# Patient Record
Sex: FEMALE | Race: WHITE | Employment: OTHER | URBAN - METROPOLITAN AREA
[De-identification: names, ages, dates, MRNs, and addresses within clinical notes are randomized per-mention and may not be internally consistent; named-entity substitution may affect disease eponyms.]

---

## 2018-06-15 ENCOUNTER — TRANSFERRED RECORDS (OUTPATIENT)
Dept: HEALTH INFORMATION MANAGEMENT | Facility: CLINIC | Age: 73
End: 2018-06-15

## 2018-07-11 ENCOUNTER — TRANSFERRED RECORDS (OUTPATIENT)
Dept: HEALTH INFORMATION MANAGEMENT | Facility: CLINIC | Age: 73
End: 2018-07-11

## 2018-07-11 LAB
CREAT SERPL-MCNC: 0.69 MG/DL (ref 0.55–1.02)
GFR SERPL CREATININE-BSD FRML MDRD: >60 ML/MIN/1.73M2
GLUCOSE SERPL-MCNC: 128 MG/DL (ref 74–106)
HBA1C MFR BLD: 6.4 % (ref 0–5.7)
POTASSIUM SERPL-SCNC: 4.2 MMOL/L (ref 3.5–5.1)

## 2018-07-19 ENCOUNTER — ANESTHESIA EVENT (OUTPATIENT)
Dept: SURGERY | Facility: CLINIC | Age: 73
End: 2018-07-19
Payer: MEDICARE

## 2018-07-19 ENCOUNTER — HOSPITAL ENCOUNTER (OUTPATIENT)
Facility: CLINIC | Age: 73
Discharge: HOME | End: 2018-07-19
Attending: ORTHOPAEDIC SURGERY | Admitting: ORTHOPAEDIC SURGERY
Payer: MEDICARE

## 2018-07-19 ENCOUNTER — APPOINTMENT (OUTPATIENT)
Dept: GENERAL RADIOLOGY | Facility: CLINIC | Age: 73
End: 2018-07-19
Attending: ORTHOPAEDIC SURGERY
Payer: MEDICARE

## 2018-07-19 ENCOUNTER — ANESTHESIA (OUTPATIENT)
Dept: SURGERY | Facility: CLINIC | Age: 73
End: 2018-07-19
Payer: MEDICARE

## 2018-07-19 VITALS
DIASTOLIC BLOOD PRESSURE: 70 MMHG | RESPIRATION RATE: 15 BRPM | SYSTOLIC BLOOD PRESSURE: 126 MMHG | HEART RATE: 77 BPM | HEIGHT: 65 IN | WEIGHT: 261.5 LBS | OXYGEN SATURATION: 91 % | BODY MASS INDEX: 43.57 KG/M2 | TEMPERATURE: 98.4 F

## 2018-07-19 DIAGNOSIS — Z98.890 POSTOPERATIVE STATE: Primary | ICD-10-CM

## 2018-07-19 LAB — GLUCOSE BLDC GLUCOMTR-MCNC: 146 MG/DL (ref 70–99)

## 2018-07-19 PROCEDURE — 25000125 ZZHC RX 250: Performed by: NURSE ANESTHETIST, CERTIFIED REGISTERED

## 2018-07-19 PROCEDURE — 37000008 ZZH ANESTHESIA TECHNICAL FEE, 1ST 30 MIN: Performed by: ORTHOPAEDIC SURGERY

## 2018-07-19 PROCEDURE — 40000169 ZZH STATISTIC PRE-PROCEDURE ASSESSMENT I: Performed by: ORTHOPAEDIC SURGERY

## 2018-07-19 PROCEDURE — 36000060 ZZH SURGERY LEVEL 3 W FLUORO 1ST 30 MIN: Performed by: ORTHOPAEDIC SURGERY

## 2018-07-19 PROCEDURE — 25000128 H RX IP 250 OP 636: Performed by: NURSE ANESTHETIST, CERTIFIED REGISTERED

## 2018-07-19 PROCEDURE — 25000128 H RX IP 250 OP 636: Performed by: ANESTHESIOLOGY

## 2018-07-19 PROCEDURE — 37000009 ZZH ANESTHESIA TECHNICAL FEE, EACH ADDTL 15 MIN: Performed by: ORTHOPAEDIC SURGERY

## 2018-07-19 PROCEDURE — 36000058 ZZH SURGERY LEVEL 3 EA 15 ADDTL MIN: Performed by: ORTHOPAEDIC SURGERY

## 2018-07-19 PROCEDURE — 25000128 H RX IP 250 OP 636: Performed by: PHYSICIAN ASSISTANT

## 2018-07-19 PROCEDURE — 40000277 XR SURGERY CARM FLUORO LESS THAN 5 MIN W STILLS

## 2018-07-19 PROCEDURE — 25000125 ZZHC RX 250: Performed by: ORTHOPAEDIC SURGERY

## 2018-07-19 PROCEDURE — 71000027 ZZH RECOVERY PHASE 2 EACH 15 MINS: Performed by: ORTHOPAEDIC SURGERY

## 2018-07-19 PROCEDURE — 27210794 ZZH OR GENERAL SUPPLY STERILE: Performed by: ORTHOPAEDIC SURGERY

## 2018-07-19 PROCEDURE — 82962 GLUCOSE BLOOD TEST: CPT

## 2018-07-19 PROCEDURE — 71000012 ZZH RECOVERY PHASE 1 LEVEL 1 FIRST HR: Performed by: ORTHOPAEDIC SURGERY

## 2018-07-19 DEVICE — IMP WIRE KIRSCHNER 0.054X4" 78.2040
Type: IMPLANTABLE DEVICE | Site: FINGER | Status: NON-FUNCTIONAL
Removed: 2018-09-28

## 2018-07-19 RX ORDER — MEPERIDINE HYDROCHLORIDE 25 MG/ML
12.5 INJECTION INTRAMUSCULAR; INTRAVENOUS; SUBCUTANEOUS EVERY 5 MIN PRN
Status: DISCONTINUED | OUTPATIENT
Start: 2018-07-19 | End: 2018-07-19 | Stop reason: HOSPADM

## 2018-07-19 RX ORDER — LIDOCAINE HYDROCHLORIDE 20 MG/ML
INJECTION, SOLUTION INFILTRATION; PERINEURAL PRN
Status: DISCONTINUED | OUTPATIENT
Start: 2018-07-19 | End: 2018-07-19

## 2018-07-19 RX ORDER — NITROGLYCERIN 0.3 MG/1
0.3 TABLET SUBLINGUAL DAILY PRN
COMMUNITY
Start: 2017-10-13

## 2018-07-19 RX ORDER — SODIUM CHLORIDE, SODIUM LACTATE, POTASSIUM CHLORIDE, CALCIUM CHLORIDE 600; 310; 30; 20 MG/100ML; MG/100ML; MG/100ML; MG/100ML
INJECTION, SOLUTION INTRAVENOUS CONTINUOUS
Status: DISCONTINUED | OUTPATIENT
Start: 2018-07-19 | End: 2018-07-19 | Stop reason: HOSPADM

## 2018-07-19 RX ORDER — ALBUTEROL SULFATE 0.83 MG/ML
2.5 SOLUTION RESPIRATORY (INHALATION)
Status: ON HOLD | COMMUNITY
Start: 2013-05-17 | End: 2019-08-19

## 2018-07-19 RX ORDER — ACETAMINOPHEN 325 MG/1
650 TABLET ORAL
Status: DISCONTINUED | OUTPATIENT
Start: 2018-07-19 | End: 2018-07-19 | Stop reason: HOSPADM

## 2018-07-19 RX ORDER — SIMVASTATIN 40 MG
TABLET ORAL
Status: ON HOLD | COMMUNITY
Start: 2017-10-13 | End: 2023-10-10

## 2018-07-19 RX ORDER — HYDROMORPHONE HYDROCHLORIDE 1 MG/ML
.3-.5 INJECTION, SOLUTION INTRAMUSCULAR; INTRAVENOUS; SUBCUTANEOUS EVERY 10 MIN PRN
Status: DISCONTINUED | OUTPATIENT
Start: 2018-07-19 | End: 2018-07-19 | Stop reason: HOSPADM

## 2018-07-19 RX ORDER — LOSARTAN POTASSIUM 50 MG/1
50 TABLET ORAL DAILY
COMMUNITY
Start: 2018-06-05

## 2018-07-19 RX ORDER — FENTANYL CITRATE 50 UG/ML
25-50 INJECTION, SOLUTION INTRAMUSCULAR; INTRAVENOUS
Status: DISCONTINUED | OUTPATIENT
Start: 2018-07-19 | End: 2018-07-19 | Stop reason: HOSPADM

## 2018-07-19 RX ORDER — NALOXONE HYDROCHLORIDE 0.4 MG/ML
.1-.4 INJECTION, SOLUTION INTRAMUSCULAR; INTRAVENOUS; SUBCUTANEOUS
Status: DISCONTINUED | OUTPATIENT
Start: 2018-07-19 | End: 2018-07-19 | Stop reason: HOSPADM

## 2018-07-19 RX ORDER — ASPIRIN 81 MG/1
162 TABLET ORAL DAILY
Status: ON HOLD | COMMUNITY
Start: 2017-10-13 | End: 2019-08-19

## 2018-07-19 RX ORDER — ONDANSETRON 4 MG/1
4 TABLET, ORALLY DISINTEGRATING ORAL EVERY 30 MIN PRN
Status: DISCONTINUED | OUTPATIENT
Start: 2018-07-19 | End: 2018-07-19 | Stop reason: HOSPADM

## 2018-07-19 RX ORDER — ESCITALOPRAM OXALATE 20 MG/1
20 TABLET ORAL DAILY
COMMUNITY
Start: 2017-05-19

## 2018-07-19 RX ORDER — AMOXICILLIN 250 MG
1-2 CAPSULE ORAL 2 TIMES DAILY
Qty: 30 TABLET | Refills: 0 | Status: ON HOLD | OUTPATIENT
Start: 2018-07-19 | End: 2018-09-28

## 2018-07-19 RX ORDER — PROPOFOL 10 MG/ML
INJECTION, EMULSION INTRAVENOUS CONTINUOUS PRN
Status: DISCONTINUED | OUTPATIENT
Start: 2018-07-19 | End: 2018-07-19

## 2018-07-19 RX ORDER — ONDANSETRON 2 MG/ML
4 INJECTION INTRAMUSCULAR; INTRAVENOUS EVERY 30 MIN PRN
Status: DISCONTINUED | OUTPATIENT
Start: 2018-07-19 | End: 2018-07-19 | Stop reason: HOSPADM

## 2018-07-19 RX ORDER — CEFAZOLIN SODIUM 1 G/3ML
1 INJECTION, POWDER, FOR SOLUTION INTRAMUSCULAR; INTRAVENOUS SEE ADMIN INSTRUCTIONS
Status: DISCONTINUED | OUTPATIENT
Start: 2018-07-19 | End: 2018-07-19 | Stop reason: HOSPADM

## 2018-07-19 RX ORDER — ONDANSETRON 2 MG/ML
INJECTION INTRAMUSCULAR; INTRAVENOUS PRN
Status: DISCONTINUED | OUTPATIENT
Start: 2018-07-19 | End: 2018-07-19

## 2018-07-19 RX ORDER — HYDROMORPHONE HYDROCHLORIDE 1 MG/ML
.3-.5 INJECTION, SOLUTION INTRAMUSCULAR; INTRAVENOUS; SUBCUTANEOUS EVERY 5 MIN PRN
Status: DISCONTINUED | OUTPATIENT
Start: 2018-07-19 | End: 2018-07-19 | Stop reason: HOSPADM

## 2018-07-19 RX ORDER — OXYCODONE HYDROCHLORIDE 5 MG/1
5-10 TABLET ORAL
Qty: 30 TABLET | Refills: 0 | Status: ON HOLD | OUTPATIENT
Start: 2018-07-19 | End: 2018-09-28

## 2018-07-19 RX ORDER — OXYCODONE HYDROCHLORIDE 5 MG/1
5 TABLET ORAL
Status: DISCONTINUED | OUTPATIENT
Start: 2018-07-19 | End: 2018-07-19 | Stop reason: HOSPADM

## 2018-07-19 RX ORDER — MEPERIDINE HYDROCHLORIDE 25 MG/ML
12.5 INJECTION INTRAMUSCULAR; INTRAVENOUS; SUBCUTANEOUS
Status: DISCONTINUED | OUTPATIENT
Start: 2018-07-19 | End: 2018-07-19 | Stop reason: HOSPADM

## 2018-07-19 RX ORDER — CEFAZOLIN SODIUM 2 G/100ML
2 INJECTION, SOLUTION INTRAVENOUS
Status: COMPLETED | OUTPATIENT
Start: 2018-07-19 | End: 2018-07-19

## 2018-07-19 RX ORDER — FLUTICASONE PROPIONATE 220 UG/1
2 AEROSOL, METERED RESPIRATORY (INHALATION) AT BEDTIME
COMMUNITY
Start: 2017-05-19

## 2018-07-19 RX ORDER — ACETAMINOPHEN 325 MG/1
650 TABLET ORAL EVERY 4 HOURS PRN
Qty: 100 TABLET | Refills: 0 | Status: ON HOLD | OUTPATIENT
Start: 2018-07-19 | End: 2019-08-19

## 2018-07-19 RX ORDER — ALBUTEROL SULFATE 90 UG/1
2 AEROSOL, METERED RESPIRATORY (INHALATION) EVERY 4 HOURS PRN
COMMUNITY
Start: 2017-03-10

## 2018-07-19 RX ORDER — METOPROLOL TARTRATE 1 MG/ML
1-2 INJECTION, SOLUTION INTRAVENOUS EVERY 5 MIN PRN
Status: DISCONTINUED | OUTPATIENT
Start: 2018-07-19 | End: 2018-07-19 | Stop reason: HOSPADM

## 2018-07-19 RX ADMIN — LIDOCAINE HYDROCHLORIDE 60 MG: 20 INJECTION, SOLUTION INFILTRATION; PERINEURAL at 17:12

## 2018-07-19 RX ADMIN — SODIUM CHLORIDE, POTASSIUM CHLORIDE, SODIUM LACTATE AND CALCIUM CHLORIDE: 600; 310; 30; 20 INJECTION, SOLUTION INTRAVENOUS at 16:26

## 2018-07-19 RX ADMIN — SODIUM CHLORIDE, POTASSIUM CHLORIDE, SODIUM LACTATE AND CALCIUM CHLORIDE: 600; 310; 30; 20 INJECTION, SOLUTION INTRAVENOUS at 18:02

## 2018-07-19 RX ADMIN — CEFAZOLIN SODIUM 2 G: 2 INJECTION, SOLUTION INTRAVENOUS at 17:10

## 2018-07-19 RX ADMIN — DEXMEDETOMIDINE HYDROCHLORIDE 12 MCG: 100 INJECTION, SOLUTION INTRAVENOUS at 17:09

## 2018-07-19 RX ADMIN — PROPOFOL 180 MCG/KG/MIN: 10 INJECTION, EMULSION INTRAVENOUS at 17:12

## 2018-07-19 RX ADMIN — ONDANSETRON 4 MG: 2 INJECTION INTRAMUSCULAR; INTRAVENOUS at 17:30

## 2018-07-19 NOTE — ANESTHESIA CARE TRANSFER NOTE
Patient: Day Sahu    Procedure(s):  LEFT MIDDLE PROXIMAL INTERPHALANGEAL JOINT FUSION AND LEFT RING FINGER PROXIMAL INTERPHALANGEAL JOINT DEBRIDEMENT - Wound Class: I-Clean   - Wound Class: II-Clean Contaminated    Diagnosis: LEFT MIDDLE AND RING FINGER PIP JOINT OSTEOARTHRITIS   Diagnosis Additional Information: No value filed.    Anesthesia Type:   MAC     Note:  Airway :Room Air  Patient transferred to:PACU        Vitals: (Last set prior to Anesthesia Care Transfer)    CRNA VITALS  7/19/2018 1745 - 7/19/2018 1820      7/19/2018             EKG: Sinus rhythm                Electronically Signed By: INA Herrera CRNA  July 19, 2018  6:20 PM

## 2018-07-19 NOTE — BRIEF OP NOTE
Preop:  Left middle and ring finger PIP joint arthritis.  Postop:  Same    Procedure:    Left middle finger PIP joint fusion  Left ring finger PIP joint removal of loose body.    Surgeons:  Zev Howell MD  First Assistant:  GRETEL Schmitz    Complications:  None  Implants:  2 k wires  Antibiotics:  Ancef  Anesthesia:  Sedation    EBL:  3 cc

## 2018-07-19 NOTE — DISCHARGE INSTRUCTIONS
Same Day Surgery Discharge Instructions for  Sedation and General Anesthesia       It's not unusual to feel dizzy, light-headed or faint for up to 24 hours after surgery or while taking pain medication.  If you have these symptoms: sit for a few minutes before standing and have someone assist you when you get up to walk or use the bathroom.      You should rest and relax for the next 24 hours. We recommend you make arrangements to have an adult stay with you for at least 24 hours after your discharge.  Avoid hazardous and strenuous activity.      DO NOT DRIVE any vehicle or operate mechanical equipment for 24 hours following the end of your surgery.  Even though you may feel normal, your reactions may be affected by the medication you have received.      Do not drink alcoholic beverages for 24 hours following surgery.       Slowly progress to your regular diet as you feel able. It's not unusual to feel nauseated and/or vomit after receiving anesthesia.  If you develop these symptoms, drink clear liquids (apple juice, ginger ale, broth, 7-up, etc. ) until you feel better.  If your nausea and vomiting persists for 24 hours, please notify your surgeon.        All narcotic pain medications, along with inactivity and anesthesia, can cause constipation. Drinking plenty of liquids and increasing fiber intake will help.      For any questions of a medical nature, call your surgeon.      Do not make important decisions for 24 hours.      If you had general anesthesia, you may have a sore throat for a couple of days related to the breathing tube used during surgery.  You may use Cepacol lozenges to help with this discomfort.  If it worsens or if you develop a fever, contact your surgeon.       If you feel your pain is not well managed with the pain medications prescribed by your surgeon, please contact your surgeon's office to let them know so they can address your concerns.         General:    Elevate extremity to prevent  swelling.  For 2-3 days post-operatively elevate the extremity above the level of your heart to reduce swelling.    Apply ice to the extremity 3-4 times a day (20 minutes on/20 minutes off) for 3-4 days or until the swelling subsides.  If you are in a splint, apply ice on the top of the splint .    If you are experiencing any of the following call our office or the doctor on call immediately.      Fever greater than 101 degrees Fahrenheit, chills    Excessive redness, swelling, or drainage from incisions    Bruising and discoloration is common following surgery.    Medications:    As a reminder, do not wait until you are almost out of medication to contact the clinic for a refill.  Additionally, narcotic prescriptions cannot be faxed or electronically sent to your pharmacy, these must be picked up from the clinic.     You will be discharged with a prescription for pain medication.  Let us know in advance if you are running low.  Refills are NOT handled on the weekend or after hours.      You may use Tylenol instead of the pain medication for milder pain, or you can supplement the prescribed pain medication with Ibuprofen 800 mg every 8 hours.    Pain medication can cause constipation.  You may use an over-the-counter laxative or stool softener (Colace, Metamucil, etc.)  As needed until your bowel function returns to normal.      You may resume your preoperative medications.    Wound Care:    Keep your incision dry in the shower.  You may bathe and keep your extremity out of water or shower.  If you shower, protect it with a plastic bag.  Place bag over cast and tape it closed, cover the tape with a towel, and cover the first bag with a towel or another bag and tape it closed above the towel.        DO NOT change your dressing.  This will be done at the office or you will have specific orders on your discharge paperwork you receive after surgery.  Keep your cast or dressing dry.       Do not use any ointments or  lotions on your incision.      Follow Up appointment:  Please make sure your post-op appointment is made according to your discharge orders from surgery.  If you have questions please call my office.    Zev Howell MD  [phone]

## 2018-07-19 NOTE — IP AVS SNAPSHOT
MRN:[id number]                      After Visit Summary   7/19/2018    Day Sahu    MRN: [id number]           Thank you!     Thank you for choosing [facility] for your care. Our goal is always to provide you with excellent care. Hearing back from our patients is one way we can continue to improve our services. Please take a few minutes to complete the written survey that you may receive in the mail after you visit with us. Thank you!        Patient Information     Date Of Birth          1945        Designated Caregiver       Most Recent Value    Caregiver    Will someone help with your care after discharge? yes    Name of designated caregiver don    Phone number of caregiver [phone]    Caregiver address [address]      About your hospital stay     You were admitted on:  July 19, 2018 You last received care in the:  [facility] PreOP/Phase II    You were discharged on:  July 19, 2018       Who to Call     For medical emergencies, please call 911.  For non-urgent questions about your medical care, please call your primary care provider or clinic, [phone]  For questions related to your surgery, please call your surgery clinic        Attending Provider     Provider Specialty    Zev Howell MD Surgery       Primary Care Provider Office Phone # Fax #    Aundrea V Lyadova [phone]      After Care Instructions     Discharge Instructions       Review outpatient procedure discharge instructions with patient as directed by Provider            Ice to affected area       Ice pack to surgical site every 15 minutes per hour for 24 hours            No Dressing Change       No dressing change until follow up appointment.            No weight bearing           Return to clinic       Return to clinic in 2 weeks                  Further instructions from your care team       Same Day Surgery Discharge Instructions for  Sedation and General Anesthesia        It's not unusual to feel dizzy, light-headed or faint for up to 24 hours after surgery or while taking pain medication.  If you have these symptoms: sit for a few minutes before standing and have someone assist you when you get up to walk or use the bathroom.      You should rest and relax for the next 24 hours. We recommend you make arrangements to have an adult stay with you for at least 24 hours after your discharge.  Avoid hazardous and strenuous activity.      DO NOT DRIVE any vehicle or operate mechanical equipment for 24 hours following the end of your surgery.  Even though you may feel normal, your reactions may be affected by the medication you have received.      Do not drink alcoholic beverages for 24 hours following surgery.       Slowly progress to your regular diet as you feel able. It's not unusual to feel nauseated and/or vomit after receiving anesthesia.  If you develop these symptoms, drink clear liquids (apple juice, ginger ale, broth, 7-up, etc. ) until you feel better.  If your nausea and vomiting persists for 24 hours, please notify your surgeon.        All narcotic pain medications, along with inactivity and anesthesia, can cause constipation. Drinking plenty of liquids and increasing fiber intake will help.      For any questions of a medical nature, call your surgeon.      Do not make important decisions for 24 hours.      If you had general anesthesia, you may have a sore throat for a couple of days related to the breathing tube used during surgery.  You may use Cepacol lozenges to help with this discomfort.  If it worsens or if you develop a fever, contact your surgeon.       If you feel your pain is not well managed with the pain medications prescribed by your surgeon, please contact your surgeon's office to let them know so they can address your concerns.         General:    Elevate extremity to prevent swelling.  For 2-3 days post-operatively elevate the extremity above the level  of your heart to reduce swelling.    Apply ice to the extremity 3-4 times a day (20 minutes on/20 minutes off) for 3-4 days or until the swelling subsides.  If you are in a splint, apply ice on the top of the splint .    If you are experiencing any of the following call our office or the doctor on call immediately.      Fever greater than 101 degrees Fahrenheit, chills    Excessive redness, swelling, or drainage from incisions    Bruising and discoloration is common following surgery.    Medications:    As a reminder, do not wait until you are almost out of medication to contact the clinic for a refill.  Additionally, narcotic prescriptions cannot be faxed or electronically sent to your pharmacy, these must be picked up from the clinic.     You will be discharged with a prescription for pain medication.  Let us know in advance if you are running low.  Refills are NOT handled on the weekend or after hours.      You may use Tylenol instead of the pain medication for milder pain, or you can supplement the prescribed pain medication with Ibuprofen 800 mg every 8 hours.    Pain medication can cause constipation.  You may use an over-the-counter laxative or stool softener (Colace, Metamucil, etc.)  As needed until your bowel function returns to normal.      You may resume your preoperative medications.    Wound Care:    Keep your incision dry in the shower.  You may bathe and keep your extremity out of water or shower.  If you shower, protect it with a plastic bag.  Place bag over cast and tape it closed, cover the tape with a towel, and cover the first bag with a towel or another bag and tape it closed above the towel.        DO NOT change your dressing.  This will be done at the office or you will have specific orders on your discharge paperwork you receive after surgery.  Keep your cast or dressing dry.       Do not use any ointments or lotions on your incision.      Follow Up appointment:  Please make sure your  "post-op appointment is made according to your discharge orders from surgery.  If you have questions please call my office.    Zev Howell MD  [phone]      Pending Results     Date and Time Order Name Status Description    2018 1816 XR Surgery PAM Fluoro L/T 5 Min w Stills In process             Admission Information     Date & Time Provider Department Dept. Phone    2018 Zev Howell MD [facility] [place] [facility] PreOP/Phase [phone]      Your Vitals Were     Blood Pressure Pulse Temperature Respirations Height Weight    126/70 77 98.4  F (36.9  C) 15 1.638 m (5' 4.5\") 118.6 kg (261 lb 8 oz)    Pulse Oximetry BMI (Body Mass Index)                91% 44.19 kg/m2          MyChart Information     [facility] lets you send messages to your doctor, view your test results, renew your prescriptions, schedule appointments and more. To sign up, go to [email or web address] . Click on \"Log in\" on the left side of the screen, which will take you to the Welcome page. Then click on \"Sign up Now\" on the right side of the page.     You will be asked to enter the access code listed below, as well as some personal information. Please follow the directions to create your username and password.     Your access code is: [email or web address]  Expires: 10/17/2018  6:34 PM     Your access code will  in 90 days. If you need help or a new code, please call your [facility] clinic or [phone].        Care EveryWhere ID     This is your Care EveryWhere ID. This could be used by other organizations to access your [facility] medical records  [id number]        Equal Access to Services     GINA NIETO : Hadii agnieszka Gomez, meg berrios, carlyle winchesteraltylor paulson. So [facility] [phone].    ATENCIÓN: Si habla español, tiene a duque disposición servicios gratuitos de asistencia lingüística. Llame al [phone].    We comply with applicable federal civil rights laws and " [place] laws. We do not discriminate on the basis of race, color, national origin, age, disability, sex, sexual orientation, or gender identity.               Review of your medicines      UNREVIEWED medicines. Ask your doctor about these medicines        Dose / Directions    * albuterol (5 MG/ML) 0.5% neb solution   Commonly known as:  PROVENTIL        Dose:  2.5 mg   Take 2.5 mg by nebulization daily.   Refills:  0       * PROVENTIL  (90 Base) MCG/ACT Inhaler   Generic drug:  albuterol        Dose:  2 puff   Inhale 2 puffs into the lungs 2 times daily (before meals).   Refills:  0       * albuterol (2.5 MG/3ML) 0.083% neb solution        Dose:  2.5 mg   Inhale 2.5 mg into the lungs   Refills:  0       * albuterol 108 (90 Base) MCG/ACT Inhaler   Commonly known as:  PROAIR HFA/PROVENTIL HFA/VENTOLIN HFA        Dose:  2 puff   Inhale 2 puffs into the lungs   Refills:  0       aspirin 81 MG EC tablet        Dose:  162 mg   Take 162 mg by mouth daily   Refills:  0       CALCIUM PLUS ADVANCED PO        Dose:  1 each   Take 1 each by mouth daily.   Refills:  0       fluticasone 220 MCG/ACT Inhaler   Commonly known as:  FLOVENT HFA        Dose:  2 puff   Inhale 2 puffs into the lungs 2 times daily   Refills:  0       * LEXAPRO 20 MG tablet   Generic drug:  escitalopram        Dose:  30 mg   Take 30 mg by mouth daily.   Refills:  0       * escitalopram 20 MG tablet   Commonly known as:  LEXAPRO        Dose:  20 mg   Take 20 mg by mouth   Refills:  0       LISINOPRIL PO        Take  by mouth.   Refills:  0       losartan 50 MG tablet   Commonly known as:  COZAAR        Dose:  50 mg   Take 50 mg by mouth once   Refills:  0       metFORMIN 1000 MG tablet   Commonly known as:  GLUCOPHAGE        Dose:  1000 mg   Take 1,000 mg by mouth 2 times daily (with meals)   Refills:  0       MULTIVITAMIN & MINERAL Liqd        Dose:  1 each   Take 1 each by mouth daily.   Refills:  0       nitroGLYcerin 0.3 MG sublingual tablet    Commonly known as:  NITROSTAT        Dose:  0.3 mg   Place 0.3 mg under the tongue daily as needed   Refills:  0       * simvastatin 10 MG tablet   Commonly known as:  ZOCOR        Take  by mouth At Bedtime.   Refills:  0       * simvastatin 40 MG tablet   Commonly known as:  ZOCOR        TAKE 1 TABLET BY MOUTH EVERY DAY   Refills:  0       * vitamin D 2000 units tablet        Dose:  1 tablet   Take 1 tablet by mouth daily.   Refills:  0       * D 5000 5000 units Tabs   Generic drug:  Cholecalciferol        Dose:  1 tablet   Take 1 tablet by mouth   Refills:  0       * Notice:  This list has 10 medication(s) that are the same as other medications prescribed for you. Read the directions carefully, and ask your doctor or other care provider to review them with you.      START taking        Dose / Directions    acetaminophen 325 MG tablet   Commonly known as:  TYLENOL   Used for:  Postoperative state        Dose:  650 mg   Take 2 tablets (650 mg) by mouth every 4 hours as needed for other (mild pain)   Quantity:  100 tablet   Refills:  0       oxyCODONE IR 5 MG tablet   Commonly known as:  ROXICODONE   Used for:  Postoperative state        Dose:  5-10 mg   Take 1-2 tablets (5-10 mg) by mouth every 3 hours as needed for pain or other (Moderate to Severe)   Quantity:  30 tablet   Refills:  0       senna-docusate 8.6-50 MG per tablet   Commonly known as:  SENOKOT-S;PERICOLACE   Used for:  Postoperative state        Dose:  1-2 tablet   Take 1-2 tablets by mouth 2 times daily Take while on oral narcotics to prevent or treat constipation.   Quantity:  30 tablet   Refills:  0            Where to get your medicines      Some of these will need a paper prescription and others can be bought over the counter. Ask your nurse if you have questions.     Bring a paper prescription for each of these medications     acetaminophen 325 MG tablet    oxyCODONE IR 5 MG tablet    senna-docusate 8.6-50 MG per tablet                Protect  others around you: Learn how to safely use, store and throw away your medicines at www.disposemymeds.org.        Information about OPIOIDS     PRESCRIPTION OPIOIDS: WHAT YOU NEED TO KNOW   We gave you an opioid (narcotic) pain medicine. It is important to manage your pain, but opioids are not always the best choice. You should first try all the other options your care team gave you. Take this medicine for as short a time (and as few doses) as possible.     These medicines have risks:    DO NOT drive when on new or higher doses of pain medicine. These medicines can affect your alertness and reaction times, and you could be arrested for driving under the influence (DUI). If you need to use opioids long-term, talk to your care team about driving.    DO NOT operate heave machinery    DO NOT do any other dangerous activities while taking these medicines.     DO NOT drink any alcohol while taking these medicines.      If the opioid prescribed includes acetaminophen, DO NOT take with any other medicines that contain acetaminophen. Read all labels carefully. Look for the word  acetaminophen  or  Tylenol.  Ask your pharmacist if you have questions or are unsure.    You can get addicted to pain medicines, especially if you have a history of addiction (chemical, alcohol or substance dependence). Talk to your care team about ways to reduce this risk.    Store your pills in a secure place, locked if possible. We will not replace any lost or stolen medicine. If you don t finish your medicine, please throw away (dispose) as directed by your pharmacist. The [place] [employer]ution [employer] has more information about safe disposal: https://www.pca.state.[place]/living-green/managing-unwanted-medications.     All opioids tend to cause constipation. Drink plenty of water and eat foods that have a lot of fiber, such as fruits, vegetables, prune juice, apple juice and high-fiber cereal. Take a laxative (Miralax, milk of magnesia,  Dalila, Senna) if you don t move your bowels at least every other day.              Medication List: This is a list of all your medications and when to take them. Check marks below indicate your daily home schedule. Keep this list as a reference.      Medications           Morning Afternoon Evening Bedtime As Needed    acetaminophen 325 MG tablet   Commonly known as:  TYLENOL   Take 2 tablets (650 mg) by mouth every 4 hours as needed for other (mild pain)                                * albuterol (5 MG/ML) 0.5% neb solution   Commonly known as:  PROVENTIL   Take 2.5 mg by nebulization daily.                                * PROVENTIL  (90 Base) MCG/ACT Inhaler   Inhale 2 puffs into the lungs 2 times daily (before meals).   Generic drug:  albuterol                                * albuterol (2.5 MG/3ML) 0.083% neb solution   Inhale 2.5 mg into the lungs                                * albuterol 108 (90 Base) MCG/ACT Inhaler   Commonly known as:  PROAIR HFA/PROVENTIL HFA/VENTOLIN HFA   Inhale 2 puffs into the lungs                                aspirin 81 MG EC tablet   Take 162 mg by mouth daily                                CALCIUM PLUS ADVANCED PO   Take 1 each by mouth daily.                                fluticasone 220 MCG/ACT Inhaler   Commonly known as:  FLOVENT HFA   Inhale 2 puffs into the lungs 2 times daily                                * LEXAPRO 20 MG tablet   Take 30 mg by mouth daily.   Generic drug:  escitalopram                                * escitalopram 20 MG tablet   Commonly known as:  LEXAPRO   Take 20 mg by mouth                                LISINOPRIL PO   Take  by mouth.                                losartan 50 MG tablet   Commonly known as:  COZAAR   Take 50 mg by mouth once                                metFORMIN 1000 MG tablet   Commonly known as:  GLUCOPHAGE   Take 1,000 mg by mouth 2 times daily (with meals)                                MULTIVITAMIN & MINERAL Liqd    Take 1 each by mouth daily.                                nitroGLYcerin 0.3 MG sublingual tablet   Commonly known as:  NITROSTAT   Place 0.3 mg under the tongue daily as needed                                oxyCODONE IR 5 MG tablet   Commonly known as:  ROXICODONE   Take 1-2 tablets (5-10 mg) by mouth every 3 hours as needed for pain or other (Moderate to Severe)                                senna-docusate 8.6-50 MG per tablet   Commonly known as:  SENOKOT-S;PERICOLACE   Take 1-2 tablets by mouth 2 times daily Take while on oral narcotics to prevent or treat constipation.                                * simvastatin 10 MG tablet   Commonly known as:  ZOCOR   Take  by mouth At Bedtime.                                * simvastatin 40 MG tablet   Commonly known as:  ZOCOR   TAKE 1 TABLET BY MOUTH EVERY DAY                                * vitamin D 2000 units tablet   Take 1 tablet by mouth daily.                                * D 5000 5000 units Tabs   Take 1 tablet by mouth   Generic drug:  Cholecalciferol                                * Notice:  This list has 10 medication(s) that are the same as other medications prescribed for you. Read the directions carefully, and ask your doctor or other care provider to review them with you.

## 2018-07-19 NOTE — IP AVS SNAPSHOT
[facility] PreOP/Phase II    [address]    [place] [address]    Phone:  [phone]                                       After Visit Summary   7/19/2018    Day Sahu    MRN: [id number]           After Visit Summary Signature Page     I have received my discharge instructions, and my questions have been answered. I have discussed any challenges I see with this plan with the nurse or doctor.    ..........................................................................................................................................  Patient/Patient Representative Signature      ..........................................................................................................................................  Patient Representative Print Name and Relationship to Patient    ..................................................               ................................................  Date                                            Time    ..........................................................................................................................................  Reviewed by Signature/Title    ...................................................              ..............................................  Date                                                            Time

## 2018-07-19 NOTE — ANESTHESIA PREPROCEDURE EVALUATION
Procedure: Procedure(s):  ARTHRODESIS FINGER(S)  COMBINED IRRIGATION AND DEBRIDEMENT FINGER  Preop diagnosis: LEFT MIDDLE AND RING FINGER PIP JOINT OSTEOARTHRITIS     No Known Allergies  Past Medical History:   Diagnosis Date     Arthritis      Depression      Diabetes (H)      Heartburn      Hypertension      Malabsorption      Morbid obesity (H)      Restless leg syndrome      Sleep apnea      Past Surgical History:   Procedure Laterality Date     APPENDECTOMY       GYN SURGERY      D&C     LAP ADJUSTABLE GASTRIC BAND       ORTHOPEDIC SURGERY      LEFT HAND SURGERY     ORTHOPEDIC SURGERY      TOTAL KNEE     Social History   Substance Use Topics     Smoking status: Unknown If Ever Smoked     Smokeless tobacco: Not on file     Alcohol use Not on file     Prior to Admission medications    Medication Sig Start Date End Date Taking? Authorizing Provider   albuterol (PROVENTIL) (5 MG/ML) 0.5% nebulizer solution Take 2.5 mg by nebulization daily.    Reported, Patient   Albuterol Sulfate (PROVENTIL HFA) 108 (90 BASE) MCG/ACT AERS Inhale 2 puffs into the lungs 2 times daily (before meals).    Reported, Patient   Cholecalciferol (VITAMIN D) 2000 UNITS tablet Take 1 tablet by mouth daily.    Reported, Patient   escitalopram (LEXAPRO) 20 MG tablet Take 30 mg by mouth daily.    Reported, Patient   LISINOPRIL PO Take  by mouth.    Reported, Patient   Misc Natural Products (CALCIUM PLUS ADVANCED PO) Take 1 each by mouth daily.    Reported, Patient   Multiple Vitamins-Minerals (MULTIVITAMIN & MINERAL) LIQD Take 1 each by mouth daily.    Reported, Patient   simvastatin (ZOCOR) 10 MG tablet Take  by mouth At Bedtime.    Reported, Patient     Current Facility-Administered Medications Ordered in Epic   Medication Dose Route Frequency Last Rate Last Dose     ceFAZolin (ANCEF) 1 g vial to attach to  ml bag for ADULT or 50 ml bag for PEDS  1 g Intravenous See Admin Instructions         ceFAZolin (ANCEF) intermittent infusion 2 g  in 100 mL dextrose PRE-MIX  2 g Intravenous Pre-Op/Pre-procedure x 1 dose         lactated ringers infusion   Intravenous Continuous         lidocaine 1 % 1 mL  1 mL Other Q1H PRN         sodium chloride (PF) 0.9% PF flush 3 mL  3 mL Intracatheter Q1H PRN         sodium chloride (PF) 0.9% PF flush 3 mL  3 mL Intracatheter Q8H         No current Epic-ordered outpatient prescriptions on file.       lactated ringers       Wt Readings from Last 1 Encounters:   05/24/13 116.7 kg (257 lb 4.8 oz)     Temp Readings from Last 1 Encounters:   No data found for Temp     BP Readings from Last 6 Encounters:   No data found for BP     Pulse Readings from Last 4 Encounters:   No data found for Pulse     Resp Readings from Last 1 Encounters:   No data found for Resp     SpO2 Readings from Last 1 Encounters:   No data found for SpO2     No results for input(s): NA, POTASSIUM, CHLORIDE, CO2, ANIONGAP, GLC, BUN, CR, FLORECITA in the last 25664 hours.  No results for input(s): AST, ALT, ALKPHOS, BILITOTAL, LIPASE in the last 85828 hours.  No results for input(s): WBC, HGB, PLT in the last 99205 hours.  No results for input(s): ABO, RH in the last 60247 hours.  No results for input(s): INR, PTT in the last 60969 hours.   No results for input(s): TROPI in the last 87489 hours.  No results for input(s): PH, PCO2, PO2, HCO3 in the last 14781 hours.  No results for input(s): HCG in the last 83914 hours.  No results found for this or any previous visit (from the past 744 hour(s)).    RECENT LABS:   ECG:   ECHO:       Anesthesia Evaluation     .             ROS/MED HX    ENT/Pulmonary:     (+)sleep apnea, asthma uses CPAP , . .    Neurologic:  - neg neurologic ROS     Cardiovascular:     (+) Dyslipidemia, hypertension--CAD, --. : . . . :. .       METS/Exercise Tolerance:     Hematologic:         Musculoskeletal:   (+) arthritis, , , -       GI/Hepatic:         Renal/Genitourinary:         Endo:     (+) type II DM Obesity, .      Psychiatric:      (+) psychiatric history depression      Infectious Disease:         Malignancy:         Other:                     Physical Exam      Airway   Mallampati: III  TM distance: >3 FB  Neck ROM: full    Dental     Cardiovascular   Rhythm and rate: regular and normal      Pulmonary                     Anesthesia Plan      History & Physical Review  History and physical reviewed and following examination; no interval change.    ASA Status:  3 .    NPO Status:  > 8 hours    Plan for MAC Reason for MAC:  Difficulty with conscious sedation (QS)  PONV prophylaxis:  Ondansetron (or other 5HT-3)       Postoperative Care  Postoperative pain management:  IV analgesics.      Consents  Anesthetic plan, risks, benefits and alternatives discussed with:  Patient..                          .

## 2018-07-20 NOTE — OP NOTE
Procedure Date: 07/19/2018      PREOPERATIVE DIAGNOSIS:  Left middle finger and ring finger proximal interphalangeal joint arthritis.      POSTOPERATIVE DIAGNOSIS:  Left middle finger and ring finger proximal interphalangeal joint arthritis.      PROCEDURES PERFORMED:   1.  Left middle finger PIP joint fusion.   2.  Left ring finger PIP joint removal of loose body.        INDICATION:  End-stage arthritis of the middle finger at the PIP joint and loose body of the ring finger at the PIP joint.      COMPLICATIONS:  None.      IMPLANTS:  None.      SPECIMENS:  None sent.      ANTIBIOTICS:  Ancef.      ANESTHESIA:  Sedation plus local anesthetic.      ESTIMATED BLOOD LOSS:  Less than 3 mL.      FIRST ASSISTANT:  Leigha Tristan PA-C      INDICATIONS:  Consent was for left middle finger PIP joint fusion and left ring finger PIP removal of loose body and joint debridement.  Risk factors that were discussed were infection, bleeding, nerve damage, heart attack, stroke, death, blood clots, no relief of symptoms, worsening symptoms and revision surgery.  Consent was signed voluntarily.  Surgical site was marked by the patient and myself.  Surgical timeout commenced before surgery.      DESCRIPTION OF PROCEDURE:  With the patient properly identified in the preop holding area, the risk factors were discussed and surgical sites marked.  After sterile prep and drape and local anesthetic was injected, a sterile tourniquet was inflated.  First, on the ring finger over the PIP joint, a skin incision was made, coming directly on the ulnar aspect of the extensor tendon joint into the PIP joint.  The joint loose body was identified.  It was removed without difficulties.        Incision was then made over the middle finger where a previous incision was made from a surgery years ago.  A full-thickness skin were developed, extensor tendon was split and found end-stage arthritis.  Rongeurs were used to smooth it down followed by an  oscillating saw with copious amounts of irrigation.  Both ends were cut with approximately a 25-degree angulation confirmed by imaging.  Two K-wires were placed in position and had excellent alignment and positioning.  The K-wire were bent and cut.  The wounds were copiously irrigated and closed with 3-0 Vicryl for the extensor tendon and 4-0 nylon for both skin incisions.  Sterile dressings were applied and placed in a splint.  All sponge and instrument counts were correct.           JUDY BARONE MD             D: 2018   T: 2018   MT: OREN      Name:     MALGORZATA ORLANDO   MRN:      [id number]        Account:        [id number]   :      1945           Procedure Date: 2018      Document: [id number]

## 2018-07-20 NOTE — ANESTHESIA POSTPROCEDURE EVALUATION
Patient: Day Sahu    Procedure(s):  LEFT MIDDLE PROXIMAL INTERPHALANGEAL JOINT FUSION AND LEFT RING FINGER PROXIMAL INTERPHALANGEAL JOINT DEBRIDEMENT - Wound Class: I-Clean   - Wound Class: II-Clean Contaminated    Diagnosis:LEFT MIDDLE AND RING FINGER PIP JOINT OSTEOARTHRITIS   Diagnosis Additional Information: No value filed.    Anesthesia Type:  MAC    Note:  Anesthesia Post Evaluation    Patient location during evaluation: PACU  Patient participation: Able to fully participate in evaluation  Level of consciousness: awake and alert  Pain management: adequate  Airway patency: patent  Cardiovascular status: acceptable, hemodynamically stable and blood pressure returned to baseline  Respiratory status: acceptable and nasal cannula  Hydration status: acceptable  PONV: none     Anesthetic complications: None          Last vitals:  Vitals:    07/19/18 1830 07/19/18 1840 07/19/18 1850   BP: 119/62 117/68 126/70   Pulse:      Resp: 16 16 15   Temp: 36.7  C (98.1  F) 36.9  C (98.4  F) 36.9  C (98.4  F)   SpO2: 93% 94% 91%         Electronically Signed By: Emily Robles MD  July 19, 2018  7:38 PM

## 2018-09-17 ENCOUNTER — TRANSFERRED RECORDS (OUTPATIENT)
Dept: HEALTH INFORMATION MANAGEMENT | Facility: CLINIC | Age: 73
End: 2018-09-17

## 2018-09-28 ENCOUNTER — ANESTHESIA (OUTPATIENT)
Dept: SURGERY | Facility: CLINIC | Age: 73
End: 2018-09-28
Payer: MEDICARE

## 2018-09-28 ENCOUNTER — ANESTHESIA EVENT (OUTPATIENT)
Dept: SURGERY | Facility: CLINIC | Age: 73
End: 2018-09-28
Payer: MEDICARE

## 2018-09-28 ENCOUNTER — APPOINTMENT (OUTPATIENT)
Dept: GENERAL RADIOLOGY | Facility: CLINIC | Age: 73
End: 2018-09-28
Attending: ORTHOPAEDIC SURGERY
Payer: MEDICARE

## 2018-09-28 ENCOUNTER — HOSPITAL ENCOUNTER (OUTPATIENT)
Facility: CLINIC | Age: 73
Discharge: HOME | End: 2018-09-28
Attending: ORTHOPAEDIC SURGERY | Admitting: ORTHOPAEDIC SURGERY
Payer: MEDICARE

## 2018-09-28 ENCOUNTER — SURGERY (OUTPATIENT)
Age: 73
End: 2018-09-28

## 2018-09-28 VITALS
HEIGHT: 64 IN | TEMPERATURE: 97.3 F | SYSTOLIC BLOOD PRESSURE: 138 MMHG | DIASTOLIC BLOOD PRESSURE: 69 MMHG | OXYGEN SATURATION: 93 % | WEIGHT: 264.6 LBS | BODY MASS INDEX: 45.17 KG/M2 | RESPIRATION RATE: 16 BRPM | HEART RATE: 81 BPM

## 2018-09-28 DIAGNOSIS — Z98.890 POSTOPERATIVE STATE: Primary | ICD-10-CM

## 2018-09-28 LAB — GLUCOSE BLDC GLUCOMTR-MCNC: 138 MG/DL (ref 70–99)

## 2018-09-28 PROCEDURE — 25000128 H RX IP 250 OP 636: Performed by: ORTHOPAEDIC SURGERY

## 2018-09-28 PROCEDURE — 25000125 ZZHC RX 250: Performed by: ORTHOPAEDIC SURGERY

## 2018-09-28 PROCEDURE — 40000170 ZZH STATISTIC PRE-PROCEDURE ASSESSMENT II: Performed by: ORTHOPAEDIC SURGERY

## 2018-09-28 PROCEDURE — 82962 GLUCOSE BLOOD TEST: CPT

## 2018-09-28 PROCEDURE — 37000009 ZZH ANESTHESIA TECHNICAL FEE, EACH ADDTL 15 MIN: Performed by: ORTHOPAEDIC SURGERY

## 2018-09-28 PROCEDURE — 25000128 H RX IP 250 OP 636: Performed by: NURSE ANESTHETIST, CERTIFIED REGISTERED

## 2018-09-28 PROCEDURE — 27210794 ZZH OR GENERAL SUPPLY STERILE: Performed by: ORTHOPAEDIC SURGERY

## 2018-09-28 PROCEDURE — 36000052 ZZH SURGERY LEVEL 2 EA 15 ADDTL MIN: Performed by: ORTHOPAEDIC SURGERY

## 2018-09-28 PROCEDURE — 37000008 ZZH ANESTHESIA TECHNICAL FEE, 1ST 30 MIN: Performed by: ORTHOPAEDIC SURGERY

## 2018-09-28 PROCEDURE — 40000277 XR SURGERY CARM FLUORO LESS THAN 5 MIN W STILLS

## 2018-09-28 PROCEDURE — 36000054 ZZH SURGERY LEVEL 2 W FLUORO 1ST 30 MIN: Performed by: ORTHOPAEDIC SURGERY

## 2018-09-28 PROCEDURE — 71000027 ZZH RECOVERY PHASE 2 EACH 15 MINS: Performed by: ORTHOPAEDIC SURGERY

## 2018-09-28 PROCEDURE — 71000012 ZZH RECOVERY PHASE 1 LEVEL 1 FIRST HR: Performed by: ORTHOPAEDIC SURGERY

## 2018-09-28 RX ORDER — CEFAZOLIN SODIUM 2 G/100ML
2 INJECTION, SOLUTION INTRAVENOUS
Status: DISCONTINUED | OUTPATIENT
Start: 2018-09-28 | End: 2018-09-28 | Stop reason: DRUGHIGH

## 2018-09-28 RX ORDER — LIDOCAINE HYDROCHLORIDE AND EPINEPHRINE 10; 10 MG/ML; UG/ML
INJECTION, SOLUTION INFILTRATION; PERINEURAL
Status: DISCONTINUED
Start: 2018-09-28 | End: 2018-09-28 | Stop reason: HOSPADM

## 2018-09-28 RX ORDER — ONDANSETRON 2 MG/ML
4 INJECTION INTRAMUSCULAR; INTRAVENOUS EVERY 30 MIN PRN
Status: DISCONTINUED | OUTPATIENT
Start: 2018-09-28 | End: 2018-09-28 | Stop reason: HOSPADM

## 2018-09-28 RX ORDER — HYDROCODONE BITARTRATE AND ACETAMINOPHEN 5; 325 MG/1; MG/1
1 TABLET ORAL
Status: DISCONTINUED | OUTPATIENT
Start: 2018-09-28 | End: 2018-09-28 | Stop reason: HOSPADM

## 2018-09-28 RX ORDER — PROPOFOL 10 MG/ML
INJECTION, EMULSION INTRAVENOUS PRN
Status: DISCONTINUED | OUTPATIENT
Start: 2018-09-28 | End: 2018-09-28

## 2018-09-28 RX ORDER — FENTANYL CITRATE 50 UG/ML
25-50 INJECTION, SOLUTION INTRAMUSCULAR; INTRAVENOUS
Status: DISCONTINUED | OUTPATIENT
Start: 2018-09-28 | End: 2018-09-28 | Stop reason: HOSPADM

## 2018-09-28 RX ORDER — BUPIVACAINE HYDROCHLORIDE 5 MG/ML
INJECTION, SOLUTION EPIDURAL; INTRACAUDAL
Status: DISCONTINUED
Start: 2018-09-28 | End: 2018-09-28 | Stop reason: WASHOUT

## 2018-09-28 RX ORDER — HYDROMORPHONE HYDROCHLORIDE 1 MG/ML
.3-.5 INJECTION, SOLUTION INTRAMUSCULAR; INTRAVENOUS; SUBCUTANEOUS EVERY 10 MIN PRN
Status: DISCONTINUED | OUTPATIENT
Start: 2018-09-28 | End: 2018-09-28 | Stop reason: HOSPADM

## 2018-09-28 RX ORDER — HYDROCODONE BITARTRATE AND ACETAMINOPHEN 5; 325 MG/1; MG/1
1-2 TABLET ORAL EVERY 4 HOURS PRN
Qty: 10 TABLET | Refills: 0 | Status: ON HOLD | OUTPATIENT
Start: 2018-09-28 | End: 2019-08-19

## 2018-09-28 RX ORDER — LIDOCAINE HYDROCHLORIDE 10 MG/ML
INJECTION, SOLUTION INFILTRATION; PERINEURAL
Status: DISCONTINUED
Start: 2018-09-28 | End: 2018-09-28 | Stop reason: WASHOUT

## 2018-09-28 RX ORDER — ONDANSETRON 4 MG/1
4 TABLET, ORALLY DISINTEGRATING ORAL EVERY 30 MIN PRN
Status: DISCONTINUED | OUTPATIENT
Start: 2018-09-28 | End: 2018-09-28 | Stop reason: HOSPADM

## 2018-09-28 RX ORDER — SODIUM CHLORIDE, SODIUM LACTATE, POTASSIUM CHLORIDE, CALCIUM CHLORIDE 600; 310; 30; 20 MG/100ML; MG/100ML; MG/100ML; MG/100ML
INJECTION, SOLUTION INTRAVENOUS CONTINUOUS PRN
Status: DISCONTINUED | OUTPATIENT
Start: 2018-09-28 | End: 2018-09-28

## 2018-09-28 RX ORDER — CEFAZOLIN SODIUM 1 G/3ML
1 INJECTION, POWDER, FOR SOLUTION INTRAMUSCULAR; INTRAVENOUS SEE ADMIN INSTRUCTIONS
Status: DISCONTINUED | OUTPATIENT
Start: 2018-09-28 | End: 2018-09-28 | Stop reason: HOSPADM

## 2018-09-28 RX ORDER — NALOXONE HYDROCHLORIDE 0.4 MG/ML
.1-.4 INJECTION, SOLUTION INTRAMUSCULAR; INTRAVENOUS; SUBCUTANEOUS
Status: DISCONTINUED | OUTPATIENT
Start: 2018-09-28 | End: 2018-09-28 | Stop reason: HOSPADM

## 2018-09-28 RX ORDER — SODIUM CHLORIDE, SODIUM LACTATE, POTASSIUM CHLORIDE, CALCIUM CHLORIDE 600; 310; 30; 20 MG/100ML; MG/100ML; MG/100ML; MG/100ML
INJECTION, SOLUTION INTRAVENOUS CONTINUOUS
Status: DISCONTINUED | OUTPATIENT
Start: 2018-09-28 | End: 2018-09-28 | Stop reason: HOSPADM

## 2018-09-28 RX ORDER — FENTANYL CITRATE 50 UG/ML
INJECTION, SOLUTION INTRAMUSCULAR; INTRAVENOUS PRN
Status: DISCONTINUED | OUTPATIENT
Start: 2018-09-28 | End: 2018-09-28

## 2018-09-28 RX ORDER — CEFAZOLIN SODIUM 1 G/50ML
3 SOLUTION INTRAVENOUS
Status: COMPLETED | OUTPATIENT
Start: 2018-09-28 | End: 2018-09-28

## 2018-09-28 RX ORDER — BUPIVACAINE HYDROCHLORIDE AND EPINEPHRINE 5; 5 MG/ML; UG/ML
INJECTION, SOLUTION EPIDURAL; INTRACAUDAL; PERINEURAL
Status: DISCONTINUED
Start: 2018-09-28 | End: 2018-09-28 | Stop reason: HOSPADM

## 2018-09-28 RX ORDER — MEPERIDINE HYDROCHLORIDE 25 MG/ML
12.5 INJECTION INTRAMUSCULAR; INTRAVENOUS; SUBCUTANEOUS
Status: DISCONTINUED | OUTPATIENT
Start: 2018-09-28 | End: 2018-09-28 | Stop reason: HOSPADM

## 2018-09-28 RX ORDER — AMOXICILLIN 250 MG
1-2 CAPSULE ORAL 2 TIMES DAILY
Qty: 30 TABLET | Refills: 0 | Status: ON HOLD | OUTPATIENT
Start: 2018-09-28 | End: 2019-08-19

## 2018-09-28 RX ORDER — ONDANSETRON 2 MG/ML
INJECTION INTRAMUSCULAR; INTRAVENOUS PRN
Status: DISCONTINUED | OUTPATIENT
Start: 2018-09-28 | End: 2018-09-28

## 2018-09-28 RX ADMIN — ONDANSETRON 4 MG: 2 INJECTION INTRAMUSCULAR; INTRAVENOUS at 13:10

## 2018-09-28 RX ADMIN — PROPOFOL 25 MG: 10 INJECTION, EMULSION INTRAVENOUS at 13:00

## 2018-09-28 RX ADMIN — Medication 3 G: at 12:58

## 2018-09-28 RX ADMIN — MIDAZOLAM 1 MG: 1 INJECTION INTRAMUSCULAR; INTRAVENOUS at 12:58

## 2018-09-28 RX ADMIN — LIDOCAINE HYDROCHLORIDE 4.5 ML GIVEN: 10 INJECTION, SOLUTION EPIDURAL; INFILTRATION; INTRACAUDAL; PERINEURAL at 13:30

## 2018-09-28 RX ADMIN — PROPOFOL 20 MG: 10 INJECTION, EMULSION INTRAVENOUS at 13:29

## 2018-09-28 RX ADMIN — FENTANYL CITRATE 50 MCG: 50 INJECTION, SOLUTION INTRAMUSCULAR; INTRAVENOUS at 12:58

## 2018-09-28 RX ADMIN — FENTANYL CITRATE 25 MCG: 50 INJECTION, SOLUTION INTRAMUSCULAR; INTRAVENOUS at 13:05

## 2018-09-28 RX ADMIN — FENTANYL CITRATE 25 MCG: 50 INJECTION, SOLUTION INTRAMUSCULAR; INTRAVENOUS at 13:18

## 2018-09-28 RX ADMIN — PROPOFOL 25 MG: 10 INJECTION, EMULSION INTRAVENOUS at 13:25

## 2018-09-28 RX ADMIN — SODIUM CHLORIDE, POTASSIUM CHLORIDE, SODIUM LACTATE AND CALCIUM CHLORIDE: 600; 310; 30; 20 INJECTION, SOLUTION INTRAVENOUS at 12:58

## 2018-09-28 RX ADMIN — PROPOFOL 25 MG: 10 INJECTION, EMULSION INTRAVENOUS at 13:08

## 2018-09-28 NOTE — DISCHARGE INSTRUCTIONS
**Because you had anesthesia today and your history of sleep apnea, it is extremely important that you use your CPAP machine for the next 24 hours while napping or sleeping.**      Same Day Surgery Discharge Instructions for  Sedation and General Anesthesia       It's not unusual to feel dizzy, light-headed or faint for up to 24 hours after surgery or while taking pain medication.  If you have these symptoms: sit for a few minutes before standing and have someone assist you when you get up to walk or use the bathroom.      You should rest and relax for the next 24 hours. We recommend you make arrangements to have an adult stay with you for at least 24 hours after your discharge.  Avoid hazardous and strenuous activity.      DO NOT DRIVE any vehicle or operate mechanical equipment for 24 hours following the end of your surgery.  Even though you may feel normal, your reactions may be affected by the medication you have received.      Do not drink alcoholic beverages for 24 hours following surgery.       Slowly progress to your regular diet as you feel able. It's not unusual to feel nauseated and/or vomit after receiving anesthesia.  If you develop these symptoms, drink clear liquids (apple juice, ginger ale, broth, 7-up, etc. ) until you feel better.  If your nausea and vomiting persists for 24 hours, please notify your surgeon.        All narcotic pain medications, along with inactivity and anesthesia, can cause constipation. Drinking plenty of liquids and increasing fiber intake will help.      For any questions of a medical nature, call your surgeon.      Do not make important decisions for 24 hours.      If you had general anesthesia, you may have a sore throat for a couple of days related to the breathing tube used during surgery.  You may use Cepacol lozenges to help with this discomfort.  If it worsens or if you develop a fever, contact your surgeon.       If you feel your pain is not well managed with the pain  medications prescribed by your surgeon, please contact your surgeon's office to let them know so they can address your concerns.       Reasons to contact your surgeon:    1. Signs of possible infection: Check your incision daily for redness, swelling, warmth, red streaks or foul drainage.   2. Elevated temperature.  3. Pain not controlled with pain medication and/or rest.   4. Uncontrolled nausea or vomiting.  5. Any questions or concerns.

## 2018-09-28 NOTE — ANESTHESIA POSTPROCEDURE EVALUATION
Patient: Day Sahu    Procedure(s):  LEFT MIDDLE FINGER REMOVAL OF DEEP IMPLANT(K-WIRES) - Wound Class: I-Clean    Diagnosis:LEFT MIDDLE FINGER RETRIEVED HARDWARE  Diagnosis Additional Information: No value filed.    Anesthesia Type:  MAC    Note:  Anesthesia Post Evaluation    Patient location during evaluation: PACU  Patient participation: Able to fully participate in evaluation  Level of consciousness: awake  Pain management: adequate  Cardiovascular status: acceptable  Respiratory status: acceptable  Hydration status: acceptable  PONV: none     Anesthetic complications: None          Last vitals:  Vitals:    09/28/18 1430 09/28/18 1434 09/28/18 1441   BP: 121/71  126/74   Pulse:      Resp: 10  16   Temp:      SpO2: 93% 95% 97%         Electronically Signed By: ANTOINETTE BISHOP  September 28, 2018  2:58 PM

## 2018-09-28 NOTE — ANESTHESIA CARE TRANSFER NOTE
Patient: Day Sahu    Procedure(s):  LEFT MIDDLE FINGER REMOVAL OF DEEP IMPLANT(K-WIRES) - Wound Class: I-Clean    Diagnosis: LEFT MIDDLE FINGER RETRIEVED HARDWARE  Diagnosis Additional Information: No value filed.    Anesthesia Type:   MAC     Note:  Airway :Room Air  Patient transferred to:PACU  Comments: To PACU, VVS, report to RN.      Vitals: (Last set prior to Anesthesia Care Transfer)    CRNA VITALS  9/28/2018 1311 - 9/28/2018 1341      9/28/2018             Resp Rate (set): 10                Electronically Signed By: INA Aguero CRNA  September 28, 2018  1:41 PM

## 2018-09-28 NOTE — BRIEF OP NOTE
Preop:  Left middle finger symptomatic hardware.  Postop:  Same  Procedure:  Left middle finger removal of deep implants.    Anesthesia:  Sedation  EBL:  3 ml  Complications:  None  Specimens:  None  Surgeon:  Zev Howell MD  First Assistant:  Leigha MAJANO

## 2018-09-28 NOTE — IP AVS SNAPSHOT
[facility] [address]    [place] [address]    Phone:  [phone]                                       After Visit Summary   9/28/2018    Day Sahu    MRN: [id number]           After Visit Summary Signature Page     I have received my discharge instructions, and my questions have been answered. I have discussed any challenges I see with this plan with the nurse or doctor.    ..........................................................................................................................................  Patient/Patient Representative Signature      ..........................................................................................................................................  Patient Representative Print Name and Relationship to Patient    ..................................................               ................................................  Date                                   Time    ..........................................................................................................................................  Reviewed by Signature/Title    ...................................................              ..............................................  Date                                               Time          22EPIC Rev 08/18

## 2018-09-28 NOTE — IP AVS SNAPSHOT
MRN:[id number]                      After Visit Summary   9/28/2018    Day Sahu    MRN: [id number]           Thank you!     Thank you for choosing [facility] for your care. Our goal is always to provide you with excellent care. Hearing back from our patients is one way we can continue to improve our services. Please take a few minutes to complete the written survey that you may receive in the mail after you visit with us. Thank you!        Patient Information     Date Of Birth          1945        Designated Caregiver       Most Recent Value    Caregiver    Will someone help with your care after discharge? yes    Name of designated caregiver Don - spouse    Phone number of caregiver [phone]      About your hospital stay     You were admitted on:  September 28, 2018 You last received care in the:  [facility] PACU    You were discharged on:  September 28, 2018       Who to Call     For medical emergencies, please call 911.  For non-urgent questions about your medical care, please call your primary care provider or clinic, [phone]  For questions related to your surgery, please call your surgery clinic        Attending Provider     Provider Specialty    Zev Howell MD Surgery       Primary Care Provider Office Phone # Fax #    Aundrea Lyadova V [phone]      After Care Instructions      Diet as Tolerated       Return to diet before surgery, unless instructed otherwise.            Discharge Instructions       Review outpatient procedure discharge instructions with patient as directed by Provider            Ice to affected area       Ice pack to surgical site every 15 minutes per hour for 24 hours            No Dressing Change       No dressing change until follow up appointment.            Return to clinic       Return to clinic in 2 weeks                  Further instructions from your care team       **Because you had anesthesia today and your  history of sleep apnea, it is extremely important that you use your CPAP machine for the next 24 hours while napping or sleeping.**      Same Day Surgery Discharge Instructions for  Sedation and General Anesthesia       It's not unusual to feel dizzy, light-headed or faint for up to 24 hours after surgery or while taking pain medication.  If you have these symptoms: sit for a few minutes before standing and have someone assist you when you get up to walk or use the bathroom.      You should rest and relax for the next 24 hours. We recommend you make arrangements to have an adult stay with you for at least 24 hours after your discharge.  Avoid hazardous and strenuous activity.      DO NOT DRIVE any vehicle or operate mechanical equipment for 24 hours following the end of your surgery.  Even though you may feel normal, your reactions may be affected by the medication you have received.      Do not drink alcoholic beverages for 24 hours following surgery.       Slowly progress to your regular diet as you feel able. It's not unusual to feel nauseated and/or vomit after receiving anesthesia.  If you develop these symptoms, drink clear liquids (apple juice, ginger ale, broth, 7-up, etc. ) until you feel better.  If your nausea and vomiting persists for 24 hours, please notify your surgeon.        All narcotic pain medications, along with inactivity and anesthesia, can cause constipation. Drinking plenty of liquids and increasing fiber intake will help.      For any questions of a medical nature, call your surgeon.      Do not make important decisions for 24 hours.      If you had general anesthesia, you may have a sore throat for a couple of days related to the breathing tube used during surgery.  You may use Cepacol lozenges to help with this discomfort.  If it worsens or if you develop a fever, contact your surgeon.       If you feel your pain is not well managed with the pain medications prescribed by your surgeon,  "please contact your surgeon's office to let them know so they can address your concerns.       Reasons to contact your surgeon:    1. Signs of possible infection: Check your incision daily for redness, swelling, warmth, red streaks or foul drainage.   2. Elevated temperature.  3. Pain not controlled with pain medication and/or rest.   4. Uncontrolled nausea or vomiting.  5. Any questions or concerns.          Pending Results     Date and Time Order Name Status Description    2018 1347 XR Surgery PAM L/T 5 Min Fluoro w Stills In process             Admission Information     Date & Time Provider Department Dept. Phone    2018 Zev Howell MD [facility] PACU [phone]      Your Vitals Were     Blood Pressure Pulse Temperature Respirations Height Weight    126/74 81 97.3  F (36.3  C) (Temporal) 16 1.626 m (5' 4\") 120 kg (264 lb 9.6 oz)    Pulse Oximetry BMI (Body Mass Index)                97% 45.42 kg/m2          [employer] Information     [facility] lets you send messages to your doctor, view your test results, renew your prescriptions, schedule appointments and more. To sign up, go to [email or web address] . Click on \"Log in\" on the left side of the screen, which will take you to the Welcome page. Then click on \"Sign up Now\" on the right side of the page.     You will be asked to enter the access code listed below, as well as some personal information. Please follow the directions to create your username and password.     Your access code is: [email or web address]  Expires: 10/17/2018  6:34 PM     Your access code will  in 90 days. If you need help or a new code, please call your [facility] clinic or [phone].        Care EveryWhere ID     This is your Care EveryWhere ID. This could be used by other organizations to access your [facility] medical records  [id number]        Equal Access to Services     GINA NIETO AH: Bipin Gomez, meg berrios, carlyle khalil, " tylor laboy haymarvan johanne khsosh la'aan ah. So [facility] [place] [phone].    ATENCIÓN: Si habla lázaroañol, tiene a duque disposición servicios gratuitos de asistencia lingüística. Reece al [phone].    We comply with applicable federal civil rights laws and [place] laws. We do not discriminate on the basis of race, color, national origin, age, disability, sex, sexual orientation, or gender identity.               Review of your medicines      UNREVIEWED medicines. Ask your doctor about these medicines        Dose / Directions    acetaminophen 325 MG tablet   Commonly known as:  TYLENOL   Used for:  Postoperative state        Dose:  650 mg   Take 2 tablets (650 mg) by mouth every 4 hours as needed for other (mild pain)   Quantity:  100 tablet   Refills:  0       * albuterol (5 MG/ML) 0.5% neb solution   Commonly known as:  PROVENTIL        Dose:  2.5 mg   Take 2.5 mg by nebulization daily.   Refills:  0       * albuterol (2.5 MG/3ML) 0.083% neb solution        Dose:  2.5 mg   Inhale 2.5 mg into the lungs   Refills:  0       * albuterol 108 (90 Base) MCG/ACT inhaler   Commonly known as:  PROAIR HFA/PROVENTIL HFA/VENTOLIN HFA        Dose:  2 puff   Inhale 2 puffs into the lungs   Refills:  0       aspirin 81 MG EC tablet        Dose:  162 mg   Take 162 mg by mouth daily   Refills:  0       CALCIUM PLUS ADVANCED PO        Dose:  1 each   Take 1 each by mouth daily.   Refills:  0       D 5000 5000 units Tabs   Generic drug:  Cholecalciferol        Dose:  1 tablet   Take 1 tablet by mouth   Refills:  0       escitalopram 20 MG tablet   Commonly known as:  LEXAPRO        Dose:  20 mg   Take 20 mg by mouth   Refills:  0       fluticasone 220 MCG/ACT Inhaler   Commonly known as:  FLOVENT HFA        Dose:  2 puff   Inhale 2 puffs into the lungs 2 times daily   Refills:  0       losartan 50 MG tablet   Commonly known as:  COZAAR        Dose:  50 mg   Take 50 mg by mouth once   Refills:  0       metFORMIN 1000 MG tablet   Commonly  known as:  GLUCOPHAGE        Dose:  1000 mg   Take 1,000 mg by mouth 2 times daily (with meals)   Refills:  0       MULTIVITAMIN & MINERAL Liqd        Dose:  1 each   Take 1 each by mouth daily.   Refills:  0       nitroGLYcerin 0.3 MG sublingual tablet   Commonly known as:  NITROSTAT        Dose:  0.3 mg   Place 0.3 mg under the tongue daily as needed   Refills:  0       simvastatin 40 MG tablet   Commonly known as:  ZOCOR        TAKE 1 TABLET BY MOUTH EVERY DAY   Refills:  0       * Notice:  This list has 3 medication(s) that are the same as other medications prescribed for you. Read the directions carefully, and ask your doctor or other care provider to review them with you.      START taking        Dose / Directions    HYDROcodone-acetaminophen 5-325 MG per tablet   Commonly known as:  NORCO   Used for:  Postoperative state        Dose:  1-2 tablet   Take 1-2 tablets by mouth every 4 hours as needed (Moderate to Severe Pain)   Quantity:  10 tablet   Refills:  0       senna-docusate 8.6-50 MG per tablet   Commonly known as:  SENOKOT-S;PERICOLACE   Used for:  Postoperative state        Dose:  1-2 tablet   Take 1-2 tablets by mouth 2 times daily Take while on oral narcotics to prevent or treat constipation.   Quantity:  30 tablet   Refills:  0            Where to get your medicines      These medications were sent to [facility] - [address]  [address], [place] [address]     Phone:  [phone]     senna-docusate 8.6-50 MG per tablet         Some of these will need a paper prescription and others can be bought over the counter. Ask your nurse if you have questions.     Bring a paper prescription for each of these medications     HYDROcodone-acetaminophen 5-325 MG per tablet                Protect others around you: Learn how to safely use, store and throw away your medicines at www.disposemymeds.org.        Information about OPIOIDS     PRESCRIPTION OPIOIDS: WHAT  YOU NEED TO KNOW   We gave you an opioid (narcotic) pain medicine. It is important to manage your pain, but opioids are not always the best choice. You should first try all the other options your care team gave you. Take this medicine for as short a time (and as few doses) as possible.    Some activities can increase your pain, such as bandage changes or therapy sessions. It may help to take your pain medicine 30 to 60 minutes before these activities. Reduce your stress by getting enough sleep, working on hobbies you enjoy and practicing relaxation or meditation. Talk to your care team about ways to manage your pain beyond prescription opioids.    These medicines have risks:    DO NOT drive when on new or higher doses of pain medicine. These medicines can affect your alertness and reaction times, and you could be arrested for driving under the influence (DUI). If you need to use opioids long-term, talk to your care team about driving.    DO NOT operate heavy machinery    DO NOT do any other dangerous activities while taking these medicines.    DO NOT drink any alcohol while taking these medicines.     If the opioid prescribed includes acetaminophen, DO NOT take with any other medicines that contain acetaminophen. Read all labels carefully. Look for the word  acetaminophen  or  Tylenol.  Ask your pharmacist if you have questions or are unsure.    You can get addicted to pain medicines, especially if you have a history of addiction (chemical, alcohol or substance dependence). Talk to your care team about ways to reduce this risk.    All opioids tend to cause constipation. Drink plenty of water and eat foods that have a lot of fiber, such as fruits, vegetables, prune juice, apple juice and high-fiber cereal. Take a laxative (Miralax, milk of magnesia, Colace, Senna) if you don t move your bowels at least every other day. Other side effects include upset stomach, sleepiness, dizziness, throwing up, tolerance (needing  more of the medicine to have the same effect), physical dependence and slowed breathing.    Store your pills in a secure place, locked if possible. We will not replace any lost or stolen medicine. If you don t finish your medicine, please throw away (dispose) as directed by your pharmacist. The [place] [employer]ution [employer] has more information about safe disposal: https://[email or web address].[email or web address].us/living-green/managing-unwanted-medications             Medication List: This is a list of all your medications and when to take them. Check marks below indicate your daily home schedule. Keep this list as a reference.      Medications           Morning Afternoon Evening Bedtime As Needed    acetaminophen 325 MG tablet   Commonly known as:  TYLENOL   Take 2 tablets (650 mg) by mouth every 4 hours as needed for other (mild pain)                                * albuterol (5 MG/ML) 0.5% neb solution   Commonly known as:  PROVENTIL   Take 2.5 mg by nebulization daily.                                * albuterol (2.5 MG/3ML) 0.083% neb solution   Inhale 2.5 mg into the lungs                                * albuterol 108 (90 Base) MCG/ACT inhaler   Commonly known as:  PROAIR HFA/PROVENTIL HFA/VENTOLIN HFA   Inhale 2 puffs into the lungs                                aspirin 81 MG EC tablet   Take 162 mg by mouth daily                                CALCIUM PLUS ADVANCED PO   Take 1 each by mouth daily.                                D 5000 5000 units Tabs   Take 1 tablet by mouth   Generic drug:  Cholecalciferol                                escitalopram 20 MG tablet   Commonly known as:  LEXAPRO   Take 20 mg by mouth                                fluticasone 220 MCG/ACT Inhaler   Commonly known as:  FLOVENT HFA   Inhale 2 puffs into the lungs 2 times daily                                HYDROcodone-acetaminophen 5-325 MG per tablet   Commonly known as:  NORCO   Take 1-2 tablets by mouth every 4 hours as needed (Moderate to  Severe Pain)                                losartan 50 MG tablet   Commonly known as:  COZAAR   Take 50 mg by mouth once                                metFORMIN 1000 MG tablet   Commonly known as:  GLUCOPHAGE   Take 1,000 mg by mouth 2 times daily (with meals)                                MULTIVITAMIN & MINERAL Liqd   Take 1 each by mouth daily.                                nitroGLYcerin 0.3 MG sublingual tablet   Commonly known as:  NITROSTAT   Place 0.3 mg under the tongue daily as needed                                senna-docusate 8.6-50 MG per tablet   Commonly known as:  SENOKOT-S;PERICOLACE   Take 1-2 tablets by mouth 2 times daily Take while on oral narcotics to prevent or treat constipation.                                simvastatin 40 MG tablet   Commonly known as:  ZOCOR   TAKE 1 TABLET BY MOUTH EVERY DAY                                * Notice:  This list has 3 medication(s) that are the same as other medications prescribed for you. Read the directions carefully, and ask your doctor or other care provider to review them with you.

## 2018-09-28 NOTE — OR NURSING
Dr. Howell at bedside.  Has talked to .  Told patient everything went ok in OR and that he would be talking to her over the weekend.

## 2018-09-28 NOTE — ANESTHESIA PREPROCEDURE EVALUATION
Anesthesia Evaluation     . Pt has had prior anesthetic.     No history of anesthetic complications          ROS/MED HX    ENT/Pulmonary:     (+)sleep apnea, Intermittent asthma Treatment: Inhaler daily and Inhaler prn,  uses CPAP , . .    Neurologic:  - neg neurologic ROS     Cardiovascular:     (+) Dyslipidemia, hypertension--CAD, --. : . . . :. . Previous cardiac testing date:results:Stress Testdate: results: date: results: date: results:         (-) CABG   METS/Exercise Tolerance:     Hematologic:         Musculoskeletal:   (+) arthritis, , , -       GI/Hepatic:        (-) GERD   Renal/Genitourinary:         Endo:     (+) type II DM Obesity, .      Psychiatric:     (+) psychiatric history depression      Infectious Disease:         Malignancy:         Other:                     Physical Exam  Normal systems: cardiovascular and pulmonary    Airway   Mallampati: II  TM distance: >3 FB  Neck ROM: full    Dental   Comment: native    Cardiovascular       Pulmonary                     Anesthesia Plan      History & Physical Review  History and physical reviewed and following examination; no interval change.    ASA Status:  3 .    NPO Status:  > 8 hours    Plan for MAC with Propofol induction.   PONV prophylaxis:  Ondansetron (or other 5HT-3)       Postoperative Care      Consents  Anesthetic plan, risks, benefits and alternatives discussed with:  Patient..                          .

## 2018-09-29 LAB — GLUCOSE BLDC GLUCOMTR-MCNC: 107 MG/DL (ref 70–99)

## 2018-09-29 NOTE — OP NOTE
Procedure Date: 2018      PREOPERATIVE DIAGNOSIS:  Left middle finger retained symptomatic hardware..      POSTOPERATIVE DIAGNOSIS:  Left middle finger retained symptomatic hardware.      PROCEDURE:  Left middle finger removal of deep implants.      INDICATIONS:  Patient has persistent symptoms status post fusion of the PIP joint of the middle finger on the left side.      COMPLICATIONS:  None.      IMPLANTS:  None.      SPECIMENS:  None.      ANTIBIOTICS:  Ancef.      ANESTHESIA:  Sedation plus local anesthetic.      ESTIMATED BLOOD LOSS:  Less than 3 mL.        INDICATIONS:  Symptoms from left middle finger removal of deep implant extraction have been discussed including infection, bleeding, nerve damage, heart attack, stroke, death, blood clots, no relief of symptoms versus revision surgery.  Consent was signed voluntarily.  Surgical site was marked by the patient and myself.  Surgical timeout commenced before surgery.      DESCRIPTION OF PROCEDURE:  The patient was properly identified in the preop holding area.  The risks were discussed, surgical site was marked.  After sterile prep and drape, local anesthetic was injected into the field.  Imaging was used to confirm the location of the implants.  A small incision was made.  Both implants were removed without difficulty.  Wound was copiously irrigated.  Hemostasis achieved, closed with 4-0 nylon.  Sterile dressings were applied.  All sponge, instrument counts were correct.         JUDY BARONE MD             D: 2018   T: 2018   MT: CHEMA      Name:     MALGORZATA ORLANDO   MRN:      [id number]        Account:        [id number]   :      1945           Procedure Date: 2018      Document: [id number]

## 2018-09-29 NOTE — OR NURSING
Patient states that everything is going well, she will call the surgeon if any questions or concerns come up.

## 2019-07-22 ENCOUNTER — HOSPITAL ENCOUNTER (OUTPATIENT)
Dept: LAB | Facility: CLINIC | Age: 74
Discharge: HOME | End: 2019-07-22
Attending: ORTHOPAEDIC SURGERY | Admitting: ORTHOPAEDIC SURGERY
Payer: COMMERCIAL

## 2019-07-22 DIAGNOSIS — Z01.812 PRE-OPERATIVE LABORATORY EXAMINATION: ICD-10-CM

## 2019-07-22 LAB
MRSA DNA SPEC QL NAA+PROBE: NEGATIVE
SPECIMEN SOURCE: NORMAL

## 2019-07-22 PROCEDURE — 40000830 ZZHCL STATISTIC STAPH AUREUS METH RESIST SCREEN PCR: Performed by: ORTHOPAEDIC SURGERY

## 2019-07-22 PROCEDURE — 40000829 ZZHCL STATISTIC STAPH AUREUS SUSCEPT SCREEN PCR: Performed by: ORTHOPAEDIC SURGERY

## 2019-07-22 PROCEDURE — 87641 MR-STAPH DNA AMP PROBE: CPT | Performed by: ORTHOPAEDIC SURGERY

## 2019-07-22 PROCEDURE — 87640 STAPH A DNA AMP PROBE: CPT | Performed by: ORTHOPAEDIC SURGERY

## 2019-08-13 ENCOUNTER — ANESTHESIA EVENT (OUTPATIENT)
Dept: SURGERY | Facility: CLINIC | Age: 74
End: 2019-08-13
Payer: COMMERCIAL

## 2019-08-19 ENCOUNTER — ANESTHESIA (OUTPATIENT)
Dept: SURGERY | Facility: CLINIC | Age: 74
End: 2019-08-19
Payer: COMMERCIAL

## 2019-08-19 ENCOUNTER — APPOINTMENT (OUTPATIENT)
Dept: PHYSICAL THERAPY | Facility: CLINIC | Age: 74
End: 2019-08-19
Attending: ORTHOPAEDIC SURGERY
Payer: COMMERCIAL

## 2019-08-19 ENCOUNTER — HOSPITAL ENCOUNTER (OUTPATIENT)
Facility: CLINIC | Age: 74
LOS: 1 days | Discharge: HOME | End: 2019-08-20
Attending: ORTHOPAEDIC SURGERY | Admitting: ORTHOPAEDIC SURGERY
Payer: COMMERCIAL

## 2019-08-19 ENCOUNTER — APPOINTMENT (OUTPATIENT)
Dept: GENERAL RADIOLOGY | Facility: CLINIC | Age: 74
End: 2019-08-19
Attending: ORTHOPAEDIC SURGERY
Payer: COMMERCIAL

## 2019-08-19 DIAGNOSIS — Z96.651 TOTAL KNEE REPLACEMENT STATUS, RIGHT: ICD-10-CM

## 2019-08-19 DIAGNOSIS — Z96.651 S/P TOTAL KNEE ARTHROPLASTY, RIGHT: Primary | ICD-10-CM

## 2019-08-19 LAB
ANION GAP SERPL CALCULATED.3IONS-SCNC: 5 MMOL/L (ref 3–14)
BUN SERPL-MCNC: 17 MG/DL (ref 7–30)
CALCIUM SERPL-MCNC: 8.9 MG/DL (ref 8.5–10.1)
CHLORIDE SERPL-SCNC: 107 MMOL/L (ref 94–109)
CO2 SERPL-SCNC: 28 MMOL/L (ref 20–32)
CREAT SERPL-MCNC: 0.55 MG/DL (ref 0.52–1.04)
CREAT SERPL-MCNC: 0.69 MG/DL (ref 0.52–1.04)
GFR SERPL CREATININE-BSD FRML MDRD: 87 ML/MIN/{1.73_M2}
GFR SERPL CREATININE-BSD FRML MDRD: >90 ML/MIN/{1.73_M2}
GLUCOSE SERPL-MCNC: 148 MG/DL (ref 70–99)
HGB BLD-MCNC: 13.4 G/DL (ref 11.7–15.7)
PLATELET # BLD AUTO: 182 10E9/L (ref 150–450)
POTASSIUM SERPL-SCNC: 3.8 MMOL/L (ref 3.4–5.3)
SODIUM SERPL-SCNC: 140 MMOL/L (ref 133–144)

## 2019-08-19 PROCEDURE — 71000013 ZZH RECOVERY PHASE 1 LEVEL 1 EA ADDTL HR: Performed by: ORTHOPAEDIC SURGERY

## 2019-08-19 PROCEDURE — 80048 BASIC METABOLIC PNL TOTAL CA: CPT | Performed by: ORTHOPAEDIC SURGERY

## 2019-08-19 PROCEDURE — 25800025 ZZH RX 258: Performed by: ORTHOPAEDIC SURGERY

## 2019-08-19 PROCEDURE — 25800030 ZZH RX IP 258 OP 636: Performed by: ANESTHESIOLOGY

## 2019-08-19 PROCEDURE — 37000009 ZZH ANESTHESIA TECHNICAL FEE, EACH ADDTL 15 MIN: Performed by: ORTHOPAEDIC SURGERY

## 2019-08-19 PROCEDURE — 40000171 ZZH STATISTIC PRE-PROCEDURE ASSESSMENT III: Performed by: ORTHOPAEDIC SURGERY

## 2019-08-19 PROCEDURE — 25000125 ZZHC RX 250: Performed by: ANESTHESIOLOGY

## 2019-08-19 PROCEDURE — 25000128 H RX IP 250 OP 636: Performed by: ANESTHESIOLOGY

## 2019-08-19 PROCEDURE — 25000125 ZZHC RX 250: Performed by: NURSE ANESTHETIST, CERTIFIED REGISTERED

## 2019-08-19 PROCEDURE — 25000128 H RX IP 250 OP 636: Performed by: ORTHOPAEDIC SURGERY

## 2019-08-19 PROCEDURE — 40000985 XR KNEE PORT RT 1/2 VW: Mod: RT

## 2019-08-19 PROCEDURE — 27110028 ZZH OR GENERAL SUPPLY NON-STERILE: Performed by: ORTHOPAEDIC SURGERY

## 2019-08-19 PROCEDURE — 25000125 ZZHC RX 250: Performed by: ORTHOPAEDIC SURGERY

## 2019-08-19 PROCEDURE — 85049 AUTOMATED PLATELET COUNT: CPT | Performed by: ORTHOPAEDIC SURGERY

## 2019-08-19 PROCEDURE — 85018 HEMOGLOBIN: CPT | Mod: 91 | Performed by: ORTHOPAEDIC SURGERY

## 2019-08-19 PROCEDURE — 36000063 ZZH SURGERY LEVEL 4 EA 15 ADDTL MIN: Performed by: ORTHOPAEDIC SURGERY

## 2019-08-19 PROCEDURE — 97530 THERAPEUTIC ACTIVITIES: CPT | Mod: GP | Performed by: PHYSICAL THERAPIST

## 2019-08-19 PROCEDURE — C1776 JOINT DEVICE (IMPLANTABLE): HCPCS | Performed by: ORTHOPAEDIC SURGERY

## 2019-08-19 PROCEDURE — 71000012 ZZH RECOVERY PHASE 1 LEVEL 1 FIRST HR: Performed by: ORTHOPAEDIC SURGERY

## 2019-08-19 PROCEDURE — 27810169 ZZH OR IMPLANT GENERAL: Performed by: ORTHOPAEDIC SURGERY

## 2019-08-19 PROCEDURE — 82565 ASSAY OF CREATININE: CPT | Mod: 91 | Performed by: ORTHOPAEDIC SURGERY

## 2019-08-19 PROCEDURE — 27210794 ZZH OR GENERAL SUPPLY STERILE: Performed by: ORTHOPAEDIC SURGERY

## 2019-08-19 PROCEDURE — 25800030 ZZH RX IP 258 OP 636: Performed by: NURSE ANESTHETIST, CERTIFIED REGISTERED

## 2019-08-19 PROCEDURE — 25000128 H RX IP 250 OP 636: Performed by: NURSE ANESTHETIST, CERTIFIED REGISTERED

## 2019-08-19 PROCEDURE — 36415 COLL VENOUS BLD VENIPUNCTURE: CPT | Performed by: ORTHOPAEDIC SURGERY

## 2019-08-19 PROCEDURE — 97161 PT EVAL LOW COMPLEX 20 MIN: CPT | Mod: GP | Performed by: PHYSICAL THERAPIST

## 2019-08-19 PROCEDURE — 97110 THERAPEUTIC EXERCISES: CPT | Mod: GP | Performed by: PHYSICAL THERAPIST

## 2019-08-19 PROCEDURE — 36000093 ZZH SURGERY LEVEL 4 1ST 30 MIN: Performed by: ORTHOPAEDIC SURGERY

## 2019-08-19 PROCEDURE — 25000132 ZZH RX MED GY IP 250 OP 250 PS 637: Performed by: ORTHOPAEDIC SURGERY

## 2019-08-19 PROCEDURE — 25800030 ZZH RX IP 258 OP 636: Performed by: ORTHOPAEDIC SURGERY

## 2019-08-19 PROCEDURE — 37000008 ZZH ANESTHESIA TECHNICAL FEE, 1ST 30 MIN: Performed by: ORTHOPAEDIC SURGERY

## 2019-08-19 DEVICE — IMPLANTABLE DEVICE: Type: IMPLANTABLE DEVICE | Site: KNEE | Status: FUNCTIONAL

## 2019-08-19 DEVICE — BONE CEMENT RADIOPAQUE SIMPLEX HV FULL DOSE 6194-1-001: Type: IMPLANTABLE DEVICE | Site: KNEE | Status: FUNCTIONAL

## 2019-08-19 DEVICE — IMP COMP PATELLA GENESIS II 13X29MM 71420574: Type: IMPLANTABLE DEVICE | Site: KNEE | Status: FUNCTIONAL

## 2019-08-19 DEVICE — IMP BASEPLATE TIBIAL GENESIS II SZ 4 RT TI 71420184: Type: IMPLANTABLE DEVICE | Site: KNEE | Status: FUNCTIONAL

## 2019-08-19 RX ORDER — NALOXONE HYDROCHLORIDE 0.4 MG/ML
.1-.4 INJECTION, SOLUTION INTRAMUSCULAR; INTRAVENOUS; SUBCUTANEOUS
Status: DISCONTINUED | OUTPATIENT
Start: 2019-08-19 | End: 2019-08-20 | Stop reason: HOSPADM

## 2019-08-19 RX ORDER — ESCITALOPRAM OXALATE 20 MG/1
20 TABLET ORAL DAILY
Status: DISCONTINUED | OUTPATIENT
Start: 2019-08-20 | End: 2019-08-20 | Stop reason: HOSPADM

## 2019-08-19 RX ORDER — FENTANYL CITRATE 50 UG/ML
50-100 INJECTION, SOLUTION INTRAMUSCULAR; INTRAVENOUS
Status: COMPLETED | OUTPATIENT
Start: 2019-08-19 | End: 2019-08-19

## 2019-08-19 RX ORDER — MEPERIDINE HYDROCHLORIDE 25 MG/ML
12.5 INJECTION INTRAMUSCULAR; INTRAVENOUS; SUBCUTANEOUS EVERY 5 MIN PRN
Status: DISCONTINUED | OUTPATIENT
Start: 2019-08-19 | End: 2019-08-19 | Stop reason: HOSPADM

## 2019-08-19 RX ORDER — PROPOFOL 10 MG/ML
INJECTION, EMULSION INTRAVENOUS CONTINUOUS PRN
Status: DISCONTINUED | OUTPATIENT
Start: 2019-08-19 | End: 2019-08-19

## 2019-08-19 RX ORDER — CALCIUM CARBONATE 500 MG/1
1000 TABLET, CHEWABLE ORAL 4 TIMES DAILY PRN
Status: DISCONTINUED | OUTPATIENT
Start: 2019-08-19 | End: 2019-08-20 | Stop reason: HOSPADM

## 2019-08-19 RX ORDER — HYDROMORPHONE HYDROCHLORIDE 1 MG/ML
0.2 INJECTION, SOLUTION INTRAMUSCULAR; INTRAVENOUS; SUBCUTANEOUS
Status: DISCONTINUED | OUTPATIENT
Start: 2019-08-19 | End: 2019-08-20 | Stop reason: HOSPADM

## 2019-08-19 RX ORDER — CEFAZOLIN SODIUM 1 G/3ML
1 INJECTION, POWDER, FOR SOLUTION INTRAMUSCULAR; INTRAVENOUS SEE ADMIN INSTRUCTIONS
Status: DISCONTINUED | OUTPATIENT
Start: 2019-08-19 | End: 2019-08-19

## 2019-08-19 RX ORDER — CEFAZOLIN SODIUM 2 G/100ML
2 INJECTION, SOLUTION INTRAVENOUS EVERY 8 HOURS
Status: COMPLETED | OUTPATIENT
Start: 2019-08-19 | End: 2019-08-20

## 2019-08-19 RX ORDER — ONDANSETRON 4 MG/1
4 TABLET, ORALLY DISINTEGRATING ORAL EVERY 30 MIN PRN
Status: DISCONTINUED | OUTPATIENT
Start: 2019-08-19 | End: 2019-08-19 | Stop reason: HOSPADM

## 2019-08-19 RX ORDER — ONDANSETRON 2 MG/ML
4 INJECTION INTRAMUSCULAR; INTRAVENOUS EVERY 30 MIN PRN
Status: DISCONTINUED | OUTPATIENT
Start: 2019-08-19 | End: 2019-08-19 | Stop reason: HOSPADM

## 2019-08-19 RX ORDER — ALBUTEROL SULFATE 90 UG/1
2 AEROSOL, METERED RESPIRATORY (INHALATION) EVERY 4 HOURS PRN
Status: DISCONTINUED | OUTPATIENT
Start: 2019-08-19 | End: 2019-08-20 | Stop reason: HOSPADM

## 2019-08-19 RX ORDER — HYDROXYZINE HYDROCHLORIDE 10 MG/1
10 TABLET, FILM COATED ORAL EVERY 6 HOURS PRN
Status: DISCONTINUED | OUTPATIENT
Start: 2019-08-19 | End: 2019-08-20 | Stop reason: HOSPADM

## 2019-08-19 RX ORDER — ONDANSETRON 2 MG/ML
4 INJECTION INTRAMUSCULAR; INTRAVENOUS EVERY 6 HOURS PRN
Status: DISCONTINUED | OUTPATIENT
Start: 2019-08-19 | End: 2019-08-20 | Stop reason: HOSPADM

## 2019-08-19 RX ORDER — SODIUM CHLORIDE 9 MG/ML
INJECTION, SOLUTION INTRAVENOUS CONTINUOUS
Status: DISCONTINUED | OUTPATIENT
Start: 2019-08-19 | End: 2019-08-20 | Stop reason: HOSPADM

## 2019-08-19 RX ORDER — ONDANSETRON 2 MG/ML
INJECTION INTRAMUSCULAR; INTRAVENOUS PRN
Status: DISCONTINUED | OUTPATIENT
Start: 2019-08-19 | End: 2019-08-19

## 2019-08-19 RX ORDER — OXYCODONE AND ACETAMINOPHEN 5; 325 MG/1; MG/1
1-2 TABLET ORAL EVERY 4 HOURS PRN
Qty: 40 TABLET | Refills: 0 | Status: ON HOLD | OUTPATIENT
Start: 2019-08-19 | End: 2023-10-10

## 2019-08-19 RX ORDER — CEFAZOLIN SODIUM IN 0.9 % NACL 3 G/100 ML
3 INTRAVENOUS SOLUTION, PIGGYBACK (ML) INTRAVENOUS
Status: COMPLETED | OUTPATIENT
Start: 2019-08-19 | End: 2019-08-19

## 2019-08-19 RX ORDER — HYDROMORPHONE HYDROCHLORIDE 1 MG/ML
.3-.5 INJECTION, SOLUTION INTRAMUSCULAR; INTRAVENOUS; SUBCUTANEOUS EVERY 5 MIN PRN
Status: DISCONTINUED | OUTPATIENT
Start: 2019-08-19 | End: 2019-08-19 | Stop reason: HOSPADM

## 2019-08-19 RX ORDER — FENTANYL CITRATE 50 UG/ML
INJECTION, SOLUTION INTRAMUSCULAR; INTRAVENOUS PRN
Status: DISCONTINUED | OUTPATIENT
Start: 2019-08-19 | End: 2019-08-19

## 2019-08-19 RX ORDER — LIDOCAINE 40 MG/G
CREAM TOPICAL
Status: DISCONTINUED | OUTPATIENT
Start: 2019-08-19 | End: 2019-08-20 | Stop reason: HOSPADM

## 2019-08-19 RX ORDER — ACETAMINOPHEN 325 MG/1
975 TABLET ORAL EVERY 8 HOURS
Status: DISCONTINUED | OUTPATIENT
Start: 2019-08-19 | End: 2019-08-20 | Stop reason: HOSPADM

## 2019-08-19 RX ORDER — FENTANYL CITRATE 50 UG/ML
25-50 INJECTION, SOLUTION INTRAMUSCULAR; INTRAVENOUS
Status: DISCONTINUED | OUTPATIENT
Start: 2019-08-19 | End: 2019-08-19 | Stop reason: HOSPADM

## 2019-08-19 RX ORDER — OXYCODONE HYDROCHLORIDE 5 MG/1
5-10 TABLET ORAL
Status: DISCONTINUED | OUTPATIENT
Start: 2019-08-19 | End: 2019-08-20 | Stop reason: HOSPADM

## 2019-08-19 RX ORDER — SODIUM CHLORIDE, SODIUM LACTATE, POTASSIUM CHLORIDE, CALCIUM CHLORIDE 600; 310; 30; 20 MG/100ML; MG/100ML; MG/100ML; MG/100ML
INJECTION, SOLUTION INTRAVENOUS CONTINUOUS
Status: DISCONTINUED | OUTPATIENT
Start: 2019-08-19 | End: 2019-08-20 | Stop reason: HOSPADM

## 2019-08-19 RX ORDER — EPHEDRINE SULFATE 50 MG/ML
INJECTION, SOLUTION INTRAMUSCULAR; INTRAVENOUS; SUBCUTANEOUS PRN
Status: DISCONTINUED | OUTPATIENT
Start: 2019-08-19 | End: 2019-08-19

## 2019-08-19 RX ORDER — SODIUM CHLORIDE, SODIUM LACTATE, POTASSIUM CHLORIDE, CALCIUM CHLORIDE 600; 310; 30; 20 MG/100ML; MG/100ML; MG/100ML; MG/100ML
INJECTION, SOLUTION INTRAVENOUS CONTINUOUS
Status: DISCONTINUED | OUTPATIENT
Start: 2019-08-19 | End: 2019-08-19 | Stop reason: HOSPADM

## 2019-08-19 RX ORDER — LOSARTAN POTASSIUM 50 MG/1
50 TABLET ORAL DAILY
Status: DISCONTINUED | OUTPATIENT
Start: 2019-08-20 | End: 2019-08-20 | Stop reason: HOSPADM

## 2019-08-19 RX ORDER — KETOROLAC TROMETHAMINE 15 MG/ML
15 INJECTION, SOLUTION INTRAMUSCULAR; INTRAVENOUS EVERY 6 HOURS
Status: COMPLETED | OUTPATIENT
Start: 2019-08-19 | End: 2019-08-20

## 2019-08-19 RX ORDER — ACETAMINOPHEN 500 MG
1000 TABLET ORAL EVERY 6 HOURS PRN
COMMUNITY
End: 2023-10-25

## 2019-08-19 RX ORDER — PROCHLORPERAZINE MALEATE 5 MG
5 TABLET ORAL EVERY 6 HOURS PRN
Status: DISCONTINUED | OUTPATIENT
Start: 2019-08-19 | End: 2019-08-20 | Stop reason: HOSPADM

## 2019-08-19 RX ORDER — SIMVASTATIN 40 MG
40 TABLET ORAL EVERY EVENING
Status: DISCONTINUED | OUTPATIENT
Start: 2019-08-19 | End: 2019-08-20 | Stop reason: HOSPADM

## 2019-08-19 RX ORDER — ONDANSETRON 4 MG/1
4 TABLET, ORALLY DISINTEGRATING ORAL EVERY 6 HOURS PRN
Status: DISCONTINUED | OUTPATIENT
Start: 2019-08-19 | End: 2019-08-20 | Stop reason: HOSPADM

## 2019-08-19 RX ORDER — BUPIVACAINE HYDROCHLORIDE 7.5 MG/ML
INJECTION, SOLUTION INTRASPINAL PRN
Status: DISCONTINUED | OUTPATIENT
Start: 2019-08-19 | End: 2019-08-19

## 2019-08-19 RX ORDER — ALBUTEROL SULFATE 0.83 MG/ML
2.5 SOLUTION RESPIRATORY (INHALATION) EVERY 4 HOURS PRN
Status: DISCONTINUED | OUTPATIENT
Start: 2019-08-19 | End: 2019-08-19 | Stop reason: HOSPADM

## 2019-08-19 RX ORDER — AMOXICILLIN 250 MG
1-2 CAPSULE ORAL 2 TIMES DAILY
Qty: 30 TABLET | Refills: 0 | Status: ON HOLD | OUTPATIENT
Start: 2019-08-19 | End: 2023-10-10

## 2019-08-19 RX ORDER — FLUTICASONE PROPIONATE 220 UG/1
2 AEROSOL, METERED RESPIRATORY (INHALATION) 2 TIMES DAILY
Status: DISCONTINUED | OUTPATIENT
Start: 2019-08-19 | End: 2019-08-20 | Stop reason: HOSPADM

## 2019-08-19 RX ADMIN — OXYCODONE HYDROCHLORIDE 5 MG: 5 TABLET ORAL at 19:11

## 2019-08-19 RX ADMIN — SODIUM CHLORIDE: 9 INJECTION, SOLUTION INTRAVENOUS at 11:52

## 2019-08-19 RX ADMIN — PHENYLEPHRINE HYDROCHLORIDE 100 MCG: 10 INJECTION INTRAVENOUS at 08:53

## 2019-08-19 RX ADMIN — PHENYLEPHRINE HYDROCHLORIDE 100 MCG: 10 INJECTION INTRAVENOUS at 08:28

## 2019-08-19 RX ADMIN — Medication 2.5 MG: at 08:37

## 2019-08-19 RX ADMIN — SODIUM CHLORIDE, POTASSIUM CHLORIDE, SODIUM LACTATE AND CALCIUM CHLORIDE: 600; 310; 30; 20 INJECTION, SOLUTION INTRAVENOUS at 08:09

## 2019-08-19 RX ADMIN — Medication 7.5 MG: at 08:24

## 2019-08-19 RX ADMIN — SODIUM CHLORIDE, POTASSIUM CHLORIDE, SODIUM LACTATE AND CALCIUM CHLORIDE: 600; 310; 30; 20 INJECTION, SOLUTION INTRAVENOUS at 06:15

## 2019-08-19 RX ADMIN — MIDAZOLAM 0.5 MG: 1 INJECTION INTRAMUSCULAR; INTRAVENOUS at 06:57

## 2019-08-19 RX ADMIN — METFORMIN HYDROCHLORIDE 1000 MG: 500 TABLET, FILM COATED ORAL at 18:37

## 2019-08-19 RX ADMIN — FENTANYL CITRATE 50 MCG: 50 INJECTION, SOLUTION INTRAMUSCULAR; INTRAVENOUS at 07:31

## 2019-08-19 RX ADMIN — Medication 5 MG: at 08:53

## 2019-08-19 RX ADMIN — PHENYLEPHRINE HYDROCHLORIDE 100 MCG: 10 INJECTION INTRAVENOUS at 09:06

## 2019-08-19 RX ADMIN — PHENYLEPHRINE HYDROCHLORIDE 100 MCG: 10 INJECTION INTRAVENOUS at 08:45

## 2019-08-19 RX ADMIN — SIMVASTATIN 40 MG: 40 TABLET, FILM COATED ORAL at 20:48

## 2019-08-19 RX ADMIN — PHENYLEPHRINE HYDROCHLORIDE 100 MCG: 10 INJECTION INTRAVENOUS at 09:02

## 2019-08-19 RX ADMIN — ACETAMINOPHEN 975 MG: 325 TABLET, FILM COATED ORAL at 13:46

## 2019-08-19 RX ADMIN — FLUTICASONE PROPIONATE 2 PUFF: 220 AEROSOL, METERED RESPIRATORY (INHALATION) at 21:09

## 2019-08-19 RX ADMIN — PHENYLEPHRINE HYDROCHLORIDE 100 MCG: 10 INJECTION INTRAVENOUS at 08:06

## 2019-08-19 RX ADMIN — OXYCODONE HYDROCHLORIDE 5 MG: 5 TABLET ORAL at 16:06

## 2019-08-19 RX ADMIN — KETOROLAC TROMETHAMINE 15 MG: 15 INJECTION, SOLUTION INTRAMUSCULAR; INTRAVENOUS at 20:48

## 2019-08-19 RX ADMIN — FENTANYL CITRATE 50 MCG: 50 INJECTION INTRAMUSCULAR; INTRAVENOUS at 06:53

## 2019-08-19 RX ADMIN — KETOROLAC TROMETHAMINE 15 MG: 15 INJECTION, SOLUTION INTRAMUSCULAR; INTRAVENOUS at 13:46

## 2019-08-19 RX ADMIN — BUPIVACAINE HYDROCHLORIDE IN DEXTROSE 12 MG: 7.5 INJECTION, SOLUTION SUBARACHNOID at 07:45

## 2019-08-19 RX ADMIN — Medication 3 G: at 07:45

## 2019-08-19 RX ADMIN — SODIUM CHLORIDE 1 G: 9 INJECTION, SOLUTION INTRAVENOUS at 07:52

## 2019-08-19 RX ADMIN — ONDANSETRON 4 MG: 2 INJECTION INTRAMUSCULAR; INTRAVENOUS at 07:38

## 2019-08-19 RX ADMIN — LIDOCAINE HYDROCHLORIDE 0.3 ML: 10 INJECTION, SOLUTION EPIDURAL; INFILTRATION; INTRACAUDAL; PERINEURAL at 06:15

## 2019-08-19 RX ADMIN — PROPOFOL 50 MCG/KG/MIN: 10 INJECTION, EMULSION INTRAVENOUS at 07:44

## 2019-08-19 RX ADMIN — PHENYLEPHRINE HYDROCHLORIDE 100 MCG: 10 INJECTION INTRAVENOUS at 08:37

## 2019-08-19 RX ADMIN — MIDAZOLAM 1 MG: 1 INJECTION INTRAMUSCULAR; INTRAVENOUS at 07:31

## 2019-08-19 RX ADMIN — BUPIVACAINE HYDROCHLORIDE 20 ML GIVEN: 5 INJECTION, SOLUTION EPIDURAL; INTRACAUDAL; PERINEURAL at 07:01

## 2019-08-19 RX ADMIN — ACETAMINOPHEN 975 MG: 325 TABLET, FILM COATED ORAL at 20:48

## 2019-08-19 RX ADMIN — PHENYLEPHRINE HYDROCHLORIDE 100 MCG: 10 INJECTION INTRAVENOUS at 08:16

## 2019-08-19 RX ADMIN — SODIUM CHLORIDE 1 G: 9 INJECTION, SOLUTION INTRAVENOUS at 08:58

## 2019-08-19 RX ADMIN — CEFAZOLIN SODIUM 2 G: 2 INJECTION, SOLUTION INTRAVENOUS at 16:08

## 2019-08-19 RX ADMIN — MIDAZOLAM 1 MG: 1 INJECTION INTRAMUSCULAR; INTRAVENOUS at 06:54

## 2019-08-19 ASSESSMENT — MIFFLIN-ST. JEOR: SCORE: 1697.5

## 2019-08-19 ASSESSMENT — ENCOUNTER SYMPTOMS: ORTHOPNEA: 0

## 2019-08-19 NOTE — PROGRESS NOTES
08/19/19 1626   Quick Adds   Type of Visit Initial PT Evaluation   Living Environment   Lives With spouse   Living Arrangements house   Home Accessibility stairs within home   Number of Stairs, Main Entrance none   Number of Stairs, Within Home, Primary   (12)   Stair Railings, Within Home, Primary railing on left side (ascending)   Transportation Anticipated car, drives self;family or friend will provide   Living Environment Comment Pt's spouse will be able to assist at time of discharge.    Self-Care   Usual Activity Tolerance good   Current Activity Tolerance moderate   Regular Exercise   (OP PT for urinary incontinence )   Equipment Currently Used at Home none   Activity/Exercise/Self-Care Comment Pt owns FWW.    Functional Level Prior   Ambulation 0-->independent   Transferring 0-->independent   Fall history within last six months no   General Information   Onset of Illness/Injury or Date of Surgery - Date 08/19/19   Referring Physician Mick Rodgers MD   Patient/Family Goals Statement Return home.    Pertinent History of Current Problem (include personal factors and/or comorbidities that impact the POC) 74 y/o female admitted under outpatient status, POD # 0 R TKA.    Precautions/Limitations fall precautions  (KI on at night. )   Weight-Bearing Status - RLE weight-bearing as tolerated   General Observations Pt in supine upon arrival of therapist.    General Info Comments Ambulate with assist.    Cognitive Status Examination   Orientation orientation to person, place and time   Level of Consciousness alert   Follows Commands and Answers Questions 100% of the time   Personal Safety and Judgment intact   Pain Assessment   Patient Currently in Pain   (R knee pain at rest: 3-4/10)   Integumentary/Edema   Integumentary/Edema Comments R knee incision covered with dressing, hemovac intact.    Posture    Posture Comments No deficits noted.    Range of Motion (ROM)   ROM Comment R knee ROM: 10-75 degrees,  "otherwise B LEs WFL.    Strength   Strength Comments Not formally assessed, pt demonstrated sufficient R LE strength to complete SLR independently.    Bed Mobility   Bed Mobility Comments Supine <> sit, SBA.    Transfer Skills   Transfer Comments Sit <> stand with FWW and CGA.    Gait   Gait Comments Pt amb 10' with FWW and CGA, noted no R knee buckling with no KI donned.    Balance   Balance Comments Noted good seated and standing balance at FWW.    Sensory Examination   Sensory Perception Comments Pt denies numbness/tingling in B LEs.    Modality Interventions   Planned Modality Interventions Cryotherapy   Planned Modality Interventions Comments PRN.   General Therapy Interventions   Planned Therapy Interventions bed mobility training;gait training;ROM;strengthening;transfer training   Clinical Impression   Criteria for Skilled Therapeutic Intervention yes, treatment indicated   PT Diagnosis Difficulty with gait.    Influenced by the following impairments Pain, Impaired R knee ROM, Decreased strength, Decreased activity tolerance   Functional limitations due to impairments Limited functional mobility requiring AD and assist.    Clinical Presentation Stable/Uncomplicated   Clinical Presentation Rationale Based on PMH, current presentation, and social support.    Clinical Decision Making (Complexity) Low complexity   Therapy Frequency 2x/day   Predicted Duration of Therapy Intervention (days/wks) 3 days   Anticipated Discharge Disposition Home with Outpatient Therapy   Risk & Benefits of therapy have been explained Yes   Patient, Family & other staff in agreement with plan of care Yes   [facility] Product [employer]-PAC TM \"6 Clicks\"   2016, Trustees of [facility], under license to [employer].  All rights reserved.   6 Clicks Short Forms Basic Mobility Inpatient Short Form   [facility] AM-PAC  \"6 Clicks\" V.2 Basic Mobility Inpatient Short Form   1. Turning from your back to your side while in a flat bed " without using bedrails? 3 - A Little   2. Moving from lying on your back to sitting on the side of a flat bed without using bedrails? 3 - A Little   3. Moving to and from a bed to a chair (including a wheelchair)? 3 - A Little   4. Standing up from a chair using your arms (e.g., wheelchair, or bedside chair)? 3 - A Little   5. To walk in hospital room? 3 - A Little   6. Climbing 3-5 steps with a railing? 2 - A Lot   Basic Mobility Raw Score (Score out of 24.Lower scores equate to lower levels of function) 17   Total Evaluation Time   Total Evaluation Time (Minutes) 5

## 2019-08-19 NOTE — ANESTHESIA PREPROCEDURE EVALUATION
Anesthesia Pre-Procedure Evaluation    Patient: Day Sahu   MRN: [id number] : 1945          Preoperative Diagnosis: RIGHT KNEE DJD    Procedure(s):  RIGHT TOTAL KNEE ARTHROPLASTY ( HORNER AND NEPHEW )^    Past Medical History:   Diagnosis Date     Arthritis     OA-knees, hands, hips     Asthma      Chronic rhinitis      Depression      Diabetes (H)      Heartburn      Hyperlipidemia      Hypertension      Malabsorption      Morbid obesity (H)      Morbid obesity (H)      Restless leg syndrome      Sleep apnea      Vitamin D deficiency      Past Surgical History:   Procedure Laterality Date     APPENDECTOMY       ARTHRODESIS FINGER(S) Left 2018    Procedure: ARTHRODESIS FINGER(S);  LEFT MIDDLE PROXIMAL INTERPHALANGEAL JOINT FUSION AND LEFT RING FINGER PROXIMAL INTERPHALANGEAL JOINT DEBRIDEMENT;  Surgeon: Zev Howell MD;  Location:  OR     COLONOSCOPY       GYN SURGERY      D&C     IRRIGATION AND DEBRIDEMENT FINGER, COMBINED Left 2018    Procedure: COMBINED IRRIGATION AND DEBRIDEMENT FINGER;;  Surgeon: Zev Howell MD;  Location:  OR     LAP ADJUSTABLE GASTRIC BAND       ORTHOPEDIC SURGERY      LEFT HAND SURGERY     ORTHOPEDIC SURGERY Left     TOTAL KNEE     REMOVE HARDWARE HAND Left 2018    Procedure: REMOVE HARDWARE HAND;  LEFT MIDDLE FINGER REMOVAL OF DEEP IMPLANT(K-WIRES);  Surgeon: Zev Howell MD;  Location: [facility]       Anesthesia Evaluation     . Pt has had prior anesthetic.            ROS/MED HX    ENT/Pulmonary:     (+)sleep apnea, allergic rhinitis, asthma uses CPAP , . .    Neurologic: Comment: RLS, bilat hand numbness       Cardiovascular: Comment: Atypical cp     (+) Dyslipidemia, hypertension----. : . . . :. . Previous cardiac testing date:results:Stress Testdate: results:Neg stress date: results: date: results:         (-) DANIEL, orthopnea/PND and syncope   METS/Exercise Tolerance:     Hematologic:         Musculoskeletal:   (+) arthritis,   "-       GI/Hepatic: Comment: S/p lap banding, post surgical non absorbtion     (+) GERD Asymptomatic on medication,       Renal/Genitourinary:         Endo:     (+) type II DM Obesity, .      Psychiatric:     (+) psychiatric history depression      Infectious Disease:         Malignancy:         Other:                          Physical Exam      Airway   Mallampati: II  TM distance: >3 FB  Neck ROM: full    Dental   (+) caps    Cardiovascular   Rhythm and rate: regular      Pulmonary    breath sounds clear to auscultation            Lab Results   Component Value Date    WBC 6.3 07/22/2008    HGB 13.4 08/19/2019    HCT 42.7 07/22/2008     07/22/2008     07/06/2007    POTASSIUM 4.2 07/11/2018    CHLORIDE 105 07/06/2007    CO2 28 07/06/2007    BUN 15 03/23/2007    CR 0.69 07/11/2018     (A) 07/11/2018    FLORECITA 9.2 03/23/2007    ALBUMIN 4.1 03/23/2007    PROTTOTAL 6.9 03/23/2007    ALT 21 03/23/2007    AST 24 03/23/2007    ALKPHOS 80 03/23/2007    BILITOTAL 0.5 03/23/2007    INR 0.92 03/23/2007    TSH 2.03 03/23/2007       Preop Vitals  BP Readings from Last 3 Encounters:   09/28/18 138/69   07/19/18 126/70    Pulse Readings from Last 3 Encounters:   09/28/18 81   07/19/18 77      Resp Readings from Last 3 Encounters:   09/28/18 16   07/19/18 15    SpO2 Readings from Last 3 Encounters:   09/28/18 93%   07/19/18 91%      Temp Readings from Last 1 Encounters:   09/28/18 36.3  C (97.3  F) (Temporal)    Ht Readings from Last 1 Encounters:   09/28/18 1.626 m (5' 4\")      Wt Readings from Last 1 Encounters:   09/28/18 120 kg (264 lb 9.6 oz)    Estimated body mass index is 45.42 kg/m  as calculated from the following:    Height as of 9/28/18: 1.626 m (5' 4\").    Weight as of 9/28/18: 120 kg (264 lb 9.6 oz).       Anesthesia Plan      History & Physical Review  History and physical reviewed and following examination; no interval change.    ASA Status:  3 .        Plan for Spinal and Periph. Nerve Block for " postop pain   PONV prophylaxis:  Ondansetron (or other 5HT-3)  Monitor glucose       Postoperative Care      Consents  Anesthetic plan, risks, benefits and alternatives discussed with:  Patient.  Use of blood products discussed: Yes.   .                 Hannah Russo MD

## 2019-08-19 NOTE — PROGRESS NOTES
[facility]      OUTPATIENT PHYSICAL THERAPY EVALUATION  PLAN OF TREATMENT FOR OUTPATIENT REHABILITATION  (COMPLETE FOR INITIAL CLAIMS ONLY)  Patient's Last Name, First Name, M.I.  YOB: 1945  Day Sahu                        Provider's Name  [facility] Medical Record No.  [id number]                               Onset Date:  08/19/19   Start of Care Date:         Type:     _X_PT   ___OT   ___SLP Medical Diagnosis:                           PT Diagnosis:  Difficulty with gait.    Visits from SOC:  1   _________________________________________________________________________________  Plan of Treatment/Functional Goals    Planned Interventions: Cryotherapy, PRN.  bed mobility training, gait training, ROM, strengthening, transfer training,       Goals: See Physical Therapy Goals on Care Plan in [facility] electronic health record.    Therapy Frequency: 2x/day  Predicted Duration of Therapy Intervention: 3 days  _________________________________________________________________________________    I CERTIFY THE NEED FOR THESE SERVICES FURNISHED UNDER        THIS PLAN OF TREATMENT AND WHILE UNDER MY CARE     (Physician co-signature of this document indicates review and certification of the therapy plan).                 ,      Referring Physician: Mick Rodgers MD            Initial Assessment        See Physical Therapy evaluation dated   in Epic electronic health record.

## 2019-08-19 NOTE — ANESTHESIA CARE TRANSFER NOTE
Patient: Day Sahu    Procedure(s):  RIGHT TOTAL KNEE ARTHROPLASTY ( HORNER AND NEPHEW )^    Diagnosis: RIGHT KNEE DJD  Diagnosis Additional Information: No value filed.    Anesthesia Type:   Spinal, Periph. Nerve Block for postop pain     Note:  Airway :Face Mask  Patient transferred to:PACU  Handoff Report: Identifed the Patient, Identified the Reponsible Provider, Reviewed the pertinent medical history, Discussed the surgical course, Reviewed Intra-OP anesthesia mangement and issues during anesthesia, Set expectations for post-procedure period and Allowed opportunity for questions and acknowledgement of understanding      Vitals: (Last set prior to Anesthesia Care Transfer)    CRNA VITALS  8/19/2019 0911 - 8/19/2019 0949      8/19/2019             Pulse:  56    SpO2:  95 %    Resp Rate (set):  10                Electronically Signed By: INA Dodson CRNA  August 19, 2019  9:49 AM

## 2019-08-19 NOTE — ANESTHESIA POSTPROCEDURE EVALUATION
Patient: Day Sahu    Procedure(s):  RIGHT TOTAL KNEE ARTHROPLASTY ( HORNER AND NEPHEW )^    Diagnosis:RIGHT KNEE DJD  Diagnosis Additional Information: No value filed.    Anesthesia Type:  Spinal, Periph. Nerve Block for postop pain    Note:  Anesthesia Post Evaluation    Patient location during evaluation: PACU  Patient participation: Able to fully participate in evaluation  Level of consciousness: awake  Pain management: adequate  Airway patency: patent  Cardiovascular status: acceptable  Respiratory status: acceptable  Hydration status: acceptable  PONV: controlled     Anesthetic complications: None          Last vitals:  Vitals:    08/19/19 1155 08/19/19 1225 08/19/19 1255   BP: 133/71 131/65 123/68   Pulse: 72  71   Resp: 21 20 21   Temp: 36.7  C (98  F)     SpO2: 96% 96% 97%         Electronically Signed By: Hannah Russo MD  August 19, 2019  1:35 PM

## 2019-08-19 NOTE — PROGRESS NOTES
Admission medication history interview status for the 8/19/2019  admission is complete. See EPIC admission navigator for prior to admission medications     Medication history source reliability:Good    Medication history interview source(s):Patient    Medication history resources (including written lists, pill bottles, clinic record):None    Primary pharmacy.[facility]    Additional medication history information not noted on PTA med list :none    Time spent in this activity: 35 minutes    Prior to Admission medications    Medication Sig Last Dose Taking? Auth Provider   acetaminophen (TYLENOL) 500 MG tablet Take 1,000 mg by mouth every 6 hours as needed 8/12/2019 at 0700 Yes Reported, Patient   albuterol (PROAIR HFA/PROVENTIL HFA/VENTOLIN HFA) 108 (90 Base) MCG/ACT Inhaler Inhale 2 puffs into the lungs every 4 hours as needed  8/5/2019 at prn Yes Reported, Patient   aspirin 81 MG EC tablet Take 162 mg by mouth daily 8/12/2019 at 0700 Yes Reported, Patient   calcium 500-125 MG-UNIT TABS Take 1 tablet by mouth daily 8/19/2019 at 0400 Yes Reported, Patient   Cholecalciferol (D 5000) 5000 units TABS Take 1 tablet by mouth 8/12/2019 at 0700 Yes Reported, Patient   escitalopram (LEXAPRO) 20 MG tablet Take 20 mg by mouth 8/19/2019 at 0400 Yes Reported, Patient   fluticasone (FLOVENT HFA) 220 MCG/ACT Inhaler Inhale 2 puffs into the lungs 2 times daily 8/19/2019 at 0400 Yes Reported, Patient   Glucosamine-Chondroitin (OSTEO BI-FLEX REGULAR STRENGTH) 250-200 MG TABS Take 2 tablets by mouth daily 8/12/2019 at 0700 Yes Reported, Patient   losartan (COZAAR) 50 MG tablet Take 50 mg by mouth once 8/19/2019 at 0400 Yes Reported, Patient   metFORMIN (GLUCOPHAGE) 1000 MG tablet Take 1,000 mg by mouth 2 times daily (with meals) 8/18/2019 at 0700 Yes Reported, Patient   nitroGLYcerin (NITROSTAT) 0.3 MG sublingual tablet Place 0.3 mg under the tongue daily as needed  at prn Yes Reported, Patient   OVER-THE-COUNTER Take 750 mg by mouth daily  (Omega 3) 8/12/2019 at 0700 Yes Reported, Patient   simvastatin (ZOCOR) 40 MG tablet TAKE 1 TABLET BY MOUTH EVERY DAY 8/18/2019 at 1900 Yes Reported, Patient

## 2019-08-19 NOTE — ANESTHESIA PROCEDURE NOTES
Peripheral nerve/Neuraxial procedure note : Femoral and Adductor canal  Pre-Procedure  Performed by Hannah Russo MD  Location: pre-op      Pre-Anesthestic Checklist: patient identified, site marked, risks and benefits discussed, informed consent, monitors and equipment checked, pre-op evaluation, at physician/surgeon's request and post-op pain management    Timeout  Correct Patient: Yes   Correct Procedure: Yes   Correct Site: Yes   Correct Laterality: Yes     Site Marked: Yes   .   Procedure Documentation    .    Procedure:  right  Femoral and Adductor canal.  Local skin infiltrated with 5 mL of 1% lidocaine.     Ultrasound used to identify targeted nerve, plexus, or vascular marker and placed a needle adjacent to it., Ultrasound was used to visualize the spread of the anesthetic in close proximity to the above stated nerve. A permanent image is entered into the patient's record.  Patient Prep;mask, sterile gloves, chlorhexidine gluconate and isopropyl alcohol.  .  Needle: short bevel Needle Gauge: 21.  Needle Length (millimeters) 90  Insertion Method: Single Shot.       Assessment/Narrative  Paresthesias: No.  .  The placement was negative for: blood aspirated, painful injection and site bleeding.  Bolus given via needle..   Secured via.   Complications: none. Comments:  Real time US used to identify nerve, needle and observe injection of local anesthetic around nerve.  Low pressure.  Talking during PNB, no issues. No nerve swelling. No complications.  US image documented.

## 2019-08-19 NOTE — OP NOTE
PATIENT NAME:  Day Bravomi  MEDICAL RECORD #: [id number]  PATIENT BIRTHDAY:  1945  DATE OF SURGERY: 8/19/2019    SURGEON:    Mick Rodgers MD    1st ASSISTANT:  SHAVON Coyle OPA-C    PREOPERATIVE DIAGNOSIS:  Degenerative osteoarthritis right knee.    POSTOPERATIVE DIAGNOSIS:  Degenerative osteoarthritis right knee.    PROCEDURE: right total knee arthroplasty.    COMPONENTS: Smith [employer] Nephew - Size 6N CR femoral component, size 4 fully stemmed tibial baseplate with 12 mm CR XLPE high flexion articular insert, and 29 mm patellar component.    ANESTHESIA: Spinal     INDICATIONS FOR PROCEDURE:  The patient was brought to the operating room for elective total knee replacement for advanced degenerative osteoarthritis.  The patient received IV antibiotics preoperatively.  These will be continued for 24 hours.  The patient also will receive anticoagulants  for postoperative thrombosis prophylaxis.  The patient understands the indications, alternatives, risks, benefits, and time involved for recovery and wishes to proceed.  The patient is consented for the procedure.      DESCRIPTION OF PROCEDURE:  The patient was brought to the operating room  and following suitable Spinal anesthesia, the right knee was prepped and draped in the usual manner.  Full timeout was carried out and the patient and proper extremity and operative site identified and confirmed by all members of the operative team.    We next exsanguinated the operative leg with a Colby bandage and the tourniquet was elevated to 350 mmHg on the thigh.    A 10 cm longitudinal incision was made anteriorly for the MIS technique.  Sharp dissection was carried down through the subcutaneous tissue.  A median parapatellar arthrotomy was carried out and the knee flexed to 90 degrees.    There was severe wear in the medial compartment and severe wear in the patellofemoral  compartment and moderate wear in the lateral compartment.    The Smith [employer] Nephew  instrumentation was used.  The femur was cut for a size 6N CR  implant.  The tibia was cut for a size 4 fully stemmed base plate.  The patella was cut for a 29 mm patellar button.    The trial components were removed from the knee.  The bone surfaces were prepared with jet lavage and careful drying technique.     The posterior capsule was injected for postoperative relief with 30 cc of saline, 30 cc of 0.2% Ropivacaine, and 15 mg of Toradol.       We then cemented the components with HD Simplex cement beginning with the tibial baseplate, followed by the femoral component, followed by the patellar component.    The knee was held in extension with the trial insert in place in the tibia until the cement was set.  Once the cement was set up, the trial component was removed.  We selected the 12 mm CR XLPE high flexion articular insert.  This insert was secured and the knee checked one final time for motion, stability, alignment and soft tissue balance, all of which were excellent.    The wound was irrigated throughout with antibiotic solution using jet lavage.  The tourniquet was deflated prior to wound closure and all bleeding controlled with electrocautery.  We then re-exsanguinated the leg and reinflated the tourniquet during wound closure.    The wound was closed over a medium Hemovac drain with spaced 0 Ethibond interrupted and V-Loc running suture in the parapatellar arthrotomy, 2-0 undyed Vicryl in subcutaneous tissue and skin staples in the skin.  A sterile soft dressing was applied.  The tourniquet deflated one final time.  The patient was taken to the PAR in satisfactory condition.  There were no known complications during the procedure.    A surgical assistant was medically necessary for this procedure for pre-op positioning as well as intra-op retraction and extremity support and positioning.  He was present for the entire procedure.      CISCO MONTERO MD    CC  Cisco Montero MD          [phone]  Fax

## 2019-08-19 NOTE — PLAN OF CARE
Discharge Planner PT   Patient plan for discharge: Return home.  Current status: Orders received, evaluation completed, and treatment initiated. Patient is a 74 y/o female admitted under outpatient status POD # 0  R TKA. Pt lives with spouse, 12 stairs to access bedroom. Pt is independent at baseline. Pt performed supine <> sit with SBA. Sit <> stand and ambulation of 300 feet with FWW and CGA, noted no R knee buckling with no KI donned. R knee ROM: 10-75 degrees.   Barriers to return to prior living situation: Stairs-not yet assessed, Pain  Recommendations for discharge: Return home with assist from spouse to navigate stairs and OP PT  Rationale for recommendations: Pt will benefit from OP PT in order to improve knee ROM/strength and independence with functional mobility.        Entered by: Raisa Ellis 08/19/2019 5:20 PM

## 2019-08-19 NOTE — DISCHARGE INSTRUCTIONS
DISCHARGE INSTRUCTIONS AFTER YOUR TOTAL KNEE REPLACEMENT     CISCO MONTERO MD       Instructions to care for your wound at home:   Change the dressing daily.  Inspect your incision at the time of dressing change for increased redness, tenderness, swelling, or drainage along the incision line.  Some bruising or discoloration is usually present, but call my office for any changes in appearance that concern you.  Also call if you develop a fever above 101 degrees.     You may shower directly over the wound beginning 2 days after your surgery, but do not submerge the wound under water until after your post operative visit when the sutures are removed and the wound is completely sealed without drainage.    Activities:  Physical activity may be resumed gradually according to your comfort level. You may bear weight on your operative leg as tolerated with your crutches or walker as instructed by your therapist.  Follow your home exercise program.  Ice your knee after exercising.        Wear your knee immobilizer at night only until your office visit in 2 weeks to maintain full knee extension.  Do not use the immobilizer during the day unless otherwise instructed.      Wear the anti-embolism stockings day and night until seen in the office for your post operative visit. Remove them twice daily for one hour at a time. Keep the compression stockings flat on your leg.  Do not allow them to roll up or crease your skin.  Call if you develop calf pain.     Outpatient Physical Therapy and home exercises:  Outpatient physical therapy visits are required following discharge from the hospital. The referral for these outpatient therapy visits is routinely given to you at the time of your surgical scheduling. You should have already scheduled your therapy sessions in advance.  If you have not done so, please immediately call the therapy site of your choice to schedule the physical therapy regimen that has been prescribed for you.  You  may discontinue the crutches or walker per the therapist's recommendation.      Medications:  New medications for you on discharge will include a pain medication, a stool softener while on the narcotic pain medication, and a blood thinner (Lovenox for 7 days).  Detailed instructions will come with those medications.  You will also receive instructions on when to resume your home medications.     If you routinely take Aspirin 81 mg, hold the Aspirin while taking the formal blood thinner medication. Then take Aspirin 325 mg (1 tablet) daily for 4 weeks.  Then resume your Aspirin 81 mg daily if that is your routine.        Antibiotic coverage will be needed before any type of dental procedure.  This is a life long recommendation.  You should notify your dentist of your total knee surgery and call your dentist or our office one week before a dental appointment for antibiotics.        Clinic follow-up appointment:  Your clinic follow-up appointment has been prearranged.  Call [phone] with any questions.    Mick Rodgers MD

## 2019-08-19 NOTE — DISCHARGE SUMMARY
DISCHARGE SUMMARY    NAME:  Day Sahu  AGE:  73 year old  YOB: 1945  MRN#:  [id number]    Day Sahu was admitted for elective total knee arthroplasty.  The surgery was performed on 8/19/2019.  The postoperative course is documented in the medical record.  There were no complications. The patient was felt ready for discharge home with post-discharge physical therapy and outpatient visit prearranged.     Lab Results   Component Value Date    HGB 13.4 08/19/2019    HGB 14.6 07/22/2008    HGB 13.0 07/06/2007       The patient received 0 units transfusion.      FINAL DISCHARGE DIAGNOSIS:  Degenerative osteoarthritis knee.                    SURGICAL PROCEDURE THIS ADMISSION:  Right total knee arthroplasty.         CISCO RODGERS MD      CC: Fax [phone]         Cisco Rodgers MD

## 2019-08-19 NOTE — ANESTHESIA PROCEDURE NOTES
Peripheral nerve/Neuraxial procedure note : intrathecal  Pre-Procedure  Performed by Hannah Russo MD  Location: OR      Pre-Anesthestic Checklist: patient identified, risks and benefits discussed, informed consent and pre-op evaluation    Timeout  Correct Patient: Yes   Correct Procedure: Yes   Correct Site: Yes     Correct Position: Yes     .   Procedure Documentation    .    Procedure:    Intrathecal.  Insertion Site:L3-4  (midline approach)      Patient Prep;mask, sterile gloves, povidone-iodine 7.5% surgical scrub.  .  Needle: Malorie-Germain Spinal Needle (gauge): 22  Spinal/LP Needle Length (inches): 5 # of attempts: 1 and # of redirects:  Introducer used .       Assessment/Narrative  Paresthesias: No.  .  .  clear CSF fluid removed . Comments:  X 1 into SAB.  No blood or complications.

## 2019-08-20 ENCOUNTER — APPOINTMENT (OUTPATIENT)
Dept: PHYSICAL THERAPY | Facility: CLINIC | Age: 74
End: 2019-08-20
Attending: ORTHOPAEDIC SURGERY
Payer: COMMERCIAL

## 2019-08-20 VITALS
WEIGHT: 264 LBS | RESPIRATION RATE: 16 BRPM | HEART RATE: 72 BPM | HEIGHT: 64 IN | SYSTOLIC BLOOD PRESSURE: 124 MMHG | OXYGEN SATURATION: 93 % | BODY MASS INDEX: 45.07 KG/M2 | TEMPERATURE: 99.5 F | DIASTOLIC BLOOD PRESSURE: 63 MMHG

## 2019-08-20 LAB
ANION GAP SERPL CALCULATED.3IONS-SCNC: 1 MMOL/L (ref 3–14)
BUN SERPL-MCNC: 16 MG/DL (ref 7–30)
CALCIUM SERPL-MCNC: 7.9 MG/DL (ref 8.5–10.1)
CHLORIDE SERPL-SCNC: 108 MMOL/L (ref 94–109)
CO2 SERPL-SCNC: 31 MMOL/L (ref 20–32)
CREAT SERPL-MCNC: 0.55 MG/DL (ref 0.52–1.04)
GFR SERPL CREATININE-BSD FRML MDRD: >90 ML/MIN/{1.73_M2}
GLUCOSE SERPL-MCNC: 164 MG/DL (ref 70–99)
HGB BLD-MCNC: 11.2 G/DL (ref 11.7–15.7)
PLATELET # BLD AUTO: 15 10E9/L (ref 150–450)
POTASSIUM SERPL-SCNC: 4.4 MMOL/L (ref 3.4–5.3)
SODIUM SERPL-SCNC: 140 MMOL/L (ref 133–144)

## 2019-08-20 PROCEDURE — 36415 COLL VENOUS BLD VENIPUNCTURE: CPT | Performed by: ORTHOPAEDIC SURGERY

## 2019-08-20 PROCEDURE — 85049 AUTOMATED PLATELET COUNT: CPT | Performed by: ORTHOPAEDIC SURGERY

## 2019-08-20 PROCEDURE — 25000132 ZZH RX MED GY IP 250 OP 250 PS 637: Performed by: ORTHOPAEDIC SURGERY

## 2019-08-20 PROCEDURE — 85018 HEMOGLOBIN: CPT | Performed by: ORTHOPAEDIC SURGERY

## 2019-08-20 PROCEDURE — 97110 THERAPEUTIC EXERCISES: CPT | Mod: GP | Performed by: PHYSICAL THERAPIST

## 2019-08-20 PROCEDURE — 25000128 H RX IP 250 OP 636: Performed by: ORTHOPAEDIC SURGERY

## 2019-08-20 PROCEDURE — 97116 GAIT TRAINING THERAPY: CPT | Mod: GP | Performed by: PHYSICAL THERAPIST

## 2019-08-20 PROCEDURE — 80048 BASIC METABOLIC PNL TOTAL CA: CPT | Performed by: ORTHOPAEDIC SURGERY

## 2019-08-20 RX ADMIN — METFORMIN HYDROCHLORIDE 1000 MG: 500 TABLET, FILM COATED ORAL at 08:23

## 2019-08-20 RX ADMIN — OXYCODONE HYDROCHLORIDE 5 MG: 5 TABLET ORAL at 09:21

## 2019-08-20 RX ADMIN — ACETAMINOPHEN 975 MG: 325 TABLET, FILM COATED ORAL at 05:48

## 2019-08-20 RX ADMIN — OXYCODONE HYDROCHLORIDE 5 MG: 5 TABLET ORAL at 00:40

## 2019-08-20 RX ADMIN — FLUTICASONE PROPIONATE 2 PUFF: 220 AEROSOL, METERED RESPIRATORY (INHALATION) at 08:29

## 2019-08-20 RX ADMIN — ENOXAPARIN SODIUM 40 MG: 40 INJECTION SUBCUTANEOUS at 08:22

## 2019-08-20 RX ADMIN — CEFAZOLIN SODIUM 2 G: 2 INJECTION, SOLUTION INTRAVENOUS at 00:36

## 2019-08-20 RX ADMIN — ESCITALOPRAM OXALATE 20 MG: 20 TABLET ORAL at 08:23

## 2019-08-20 RX ADMIN — KETOROLAC TROMETHAMINE 15 MG: 15 INJECTION, SOLUTION INTRAMUSCULAR; INTRAVENOUS at 02:26

## 2019-08-20 RX ADMIN — LOSARTAN POTASSIUM 50 MG: 50 TABLET ORAL at 08:22

## 2019-08-20 RX ADMIN — KETOROLAC TROMETHAMINE 15 MG: 15 INJECTION, SOLUTION INTRAMUSCULAR; INTRAVENOUS at 08:25

## 2019-08-20 NOTE — PROGRESS NOTES
POD #1 Patient up and doing well. Plan to discharge to home today. Platelets down from 182 to 150 this AM. Hemoglobin 11.2   Pain controlled.

## 2019-08-20 NOTE — PLAN OF CARE
Patient has returned to baseline mental status: YES  Patient vital signs are at baseline: YES  Patient able to ambulate as they were prior to admission or with assist devices provided by therapies during their stay: NO, PT @10:00  Patient voiding :YES  Patient able to tolerate oral intake:YES  Patient has adequate pain control using Oral analgesics: YES

## 2019-08-20 NOTE — PLAN OF CARE
Patient will discharge home today, slept with cpap, up with assist of one, voids at bathroom, iv saline lock, Hemovac removed, dressing CDI. Will continue to monitor

## 2019-08-20 NOTE — PLAN OF CARE
Discharge Planner PT   Patient plan for discharge: Return home with spouse and OP PT.  Current status: Pt in supine upon arrival of therapist with report of 2/10 R knee pain. Pt performed supine-sit with SBA. Sit <> stand and ambulation of 200 feet x 2 with FWW and SBA, noted no R knee buckling with no KI donned. Pt navigated 6 stairs with 1 rail and SEC, SBA. R knee ROM: 8-74 degrees.   Barriers to return to prior living situation: none indicated  Recommendations for discharge: Return home with assist from spouse to navigate stairs and OP PT  Rationale for recommendations: Pt has demonstrated ability to safely complete mobility with SBA, pt's spouse present for session and verbalized understanding on how to safely assist pt. Pt will benefit from OP PT in order to improve knee ROM/strength and independence with mobility    Physical Therapy Discharge Summary    Reason for therapy discharge:    Discharged to home with outpatient therapy.    Progress towards therapy goal(s). See goals on Care Plan in [facility] electronic health record for goal details.  Goals partially met.  Barriers to achieving goals:   discharge from facility.    Therapy recommendation(s):    Continued therapy is recommended.  Rationale/Recommendations:  To improve knee ROM/strength and independence with mobility .           Entered by: Raisa Ellis 08/20/2019 11:44 AM

## 2019-08-20 NOTE — PLAN OF CARE
Patient has returned to baseline mental status: YES  Patient vital signs are at baseline:YES  Patient able to ambulate as they were prior to admission or with assist devices provided by therapies during their stay:YES  Patient voiding :YES  Patient able to tolerate oral intake: YES  Patient has adequate pain control using Oral analgesics: YES    **Platelet 150, ortho aware, no intervention.

## 2019-08-20 NOTE — PLAN OF CARE
A&Ox4.  VSS.  1-2L NC to maintain >92%.  Using IS.  Ambulated in gatica x2.  Up with 1, belt and walker.  KI on at bedtime.  Oxycodone given x2.  CMS intact.  Tolerated regular diet.  Home CPAP on at HS with 2L oxygen attached.  Hemovac.  Voiding adequate amounts. Plan for home tomorrow after therapy.

## 2019-10-01 ENCOUNTER — HEALTH MAINTENANCE LETTER (OUTPATIENT)
Age: 74
End: 2019-10-01

## 2019-12-15 ENCOUNTER — HEALTH MAINTENANCE LETTER (OUTPATIENT)
Age: 74
End: 2019-12-15

## 2020-03-01 NOTE — PROGRESS NOTES
[facility] Orthopedic Post-Op / Progress Note  Day Sahu is a 73 year old female    Today's Date:2019  Admission Date: 2019  POD # 0 TKA         Interval History:   doing well  Good pain control with block still working  Able to SLR well            Physical Exam:   All vitals have been reviewed  Temperatures:  Current - Temp: 98  F (36.7  C); Max - Temp  Av.1  F (36.7  C)  Min: 97.5  F (36.4  C)  Max: 98.5  F (36.9  C)  Pulse range: Pulse  Av  Min: 67  Max: 75  Blood pressure range: Systolic (24hrs), Av , Min:123 , Max:176   ; Diastolic (24hrs), Av, Min:65, Max:98      Intake/Output Summary (Last 24 hours) at 2019 1446  Last data filed at 2019 1112  Gross per 24 hour   Intake 1500 ml   Output 810 ml   Net 690 ml       Dressing dry and intact.          Data:   All laboratory data related to this surgery reviewed      Lab Results   Component Value Date     2019    POTASSIUM 3.8 2019    CHLORIDE 107 2019    CO2 28 2019     (H) 2019     Lab Results   Component Value Date    HGB 13.4 2019    HGB 14.6 2008    HGB 13.0 2007     Platelet Count (10e9/L)   Date Value   2019 182   2008 229   2007 244       All imaging studies related to this surgery reviewed  Hardware is intact and in good approximation         Assessment and Plan:    Assessment:  Doing well.  No immediate surgical complications identified.  No excessive bleeding  Pain well-controlled.    Plan:  Start physical therapy  Pain control measures  Discharge plan: Home tomorrow after PT    Mick Rodgers MD        English

## 2021-01-15 ENCOUNTER — HEALTH MAINTENANCE LETTER (OUTPATIENT)
Age: 76
End: 2021-01-15

## 2021-09-04 ENCOUNTER — HEALTH MAINTENANCE LETTER (OUTPATIENT)
Age: 76
End: 2021-09-04

## 2022-02-13 ENCOUNTER — HEALTH MAINTENANCE LETTER (OUTPATIENT)
Age: 77
End: 2022-02-13

## 2022-07-21 ENCOUNTER — TRANSCRIBE ORDERS (OUTPATIENT)
Dept: OTHER | Age: 77
End: 2022-07-21

## 2022-07-21 DIAGNOSIS — G89.29 CHRONIC BILATERAL LOW BACK PAIN WITHOUT SCIATICA: Primary | ICD-10-CM

## 2022-07-21 DIAGNOSIS — M54.50 CHRONIC BILATERAL LOW BACK PAIN WITHOUT SCIATICA: Primary | ICD-10-CM

## 2022-10-16 ENCOUNTER — HEALTH MAINTENANCE LETTER (OUTPATIENT)
Age: 77
End: 2022-10-16

## 2023-03-26 ENCOUNTER — HEALTH MAINTENANCE LETTER (OUTPATIENT)
Age: 78
End: 2023-03-26

## 2023-09-11 ENCOUNTER — OFFICE VISIT (OUTPATIENT)
Dept: SURGERY | Facility: CLINIC | Age: 78
End: 2023-09-11
Payer: COMMERCIAL

## 2023-09-11 VITALS
BODY MASS INDEX: 42.85 KG/M2 | DIASTOLIC BLOOD PRESSURE: 75 MMHG | HEART RATE: 77 BPM | HEIGHT: 64 IN | SYSTOLIC BLOOD PRESSURE: 131 MMHG | OXYGEN SATURATION: 94 % | WEIGHT: 251 LBS

## 2023-09-11 DIAGNOSIS — E11.65 TYPE 2 DIABETES MELLITUS WITH HYPERGLYCEMIA, WITHOUT LONG-TERM CURRENT USE OF INSULIN (H): ICD-10-CM

## 2023-09-11 DIAGNOSIS — Z98.84 BARIATRIC SURGERY STATUS: Primary | ICD-10-CM

## 2023-09-11 DIAGNOSIS — Z46.51 FITTING AND ADJUSTMENT OF GASTRIC LAP BAND: ICD-10-CM

## 2023-09-11 DIAGNOSIS — Z98.84 BARIATRIC SURGERY STATUS: ICD-10-CM

## 2023-09-11 DIAGNOSIS — K91.2 POSTSURGICAL NONABSORPTION: ICD-10-CM

## 2023-09-11 DIAGNOSIS — E66.01 MORBID OBESITY (H): ICD-10-CM

## 2023-09-11 PROCEDURE — S2083 ADJUSTMENT GASTRIC BAND: HCPCS | Performed by: PHYSICIAN ASSISTANT

## 2023-09-11 PROCEDURE — 99204 OFFICE O/P NEW MOD 45 MIN: CPT | Performed by: PHYSICIAN ASSISTANT

## 2023-09-11 RX ORDER — EMPAGLIFLOZIN 10 MG/1
25 TABLET, FILM COATED ORAL
Status: ON HOLD | COMMUNITY
Start: 2023-05-24 | End: 2023-10-10

## 2023-09-11 NOTE — ASSESSMENT & PLAN NOTE
Pt is interested is weight management but her band is not assisting with this. She would be a good candidate for a GLP-1 agonist since she also in diabetic.  Not sure if this is affordable with her medicare.  Will have her see MTM to review.

## 2023-09-11 NOTE — Clinical Note
"Emery Streeter,  I saw Day today. She is s/p band from 2007.  Last adjustment was 2013 where you removed all fluid.  Continues to have vomiting \"red zone\".  I was able to get additional 1 ml out today under vacuum suction.  Wants band removal.  Submitting to insurance. Can you put the case request in for Arturo as well?  With her age she would like to work with medical weight management following.  Thank you.  Leigha"

## 2023-09-11 NOTE — ASSESSMENT & PLAN NOTE
HgA1C elevated 8.1.  ON metformin and Jardiance.  Has stopped Jardiance due to cost. Will see MTM to review medications.  Has f/unit(s) with PCP in October.

## 2023-09-11 NOTE — PATIENT INSTRUCTIONS
"To ensure quality you may receive a patient satisfaction survey. The greatest compliment you can give is \"Likely to Recommend.\"    Nice to talk with you today.  Thank you for your trust. Below is the plan discussed.-  WAYNE Ahuja      Plan:  Will submit for pre-Approval for band removal    Please call [phone] to schedule with medical therapy management with Lauren Bloch, MTM Pharmacist to look at your diabetes medications.      Nice to talk with you today. Thank you for allowing me the privilege of caring for you. We hope we provided you with the excellent service you deserve.     To ensure the quality of our services you may receive a patient satisfaction survey from an independent monitoring company.  The greatest compliment you can give is \"Likely to Recommend\"    Below are your post band instructions.      Follow Up:  1 month for another band assessment.     -WAYNE Ahuja    Band Post-Adjustment Instructions    After an adjustment, it is very important to change your diet to full liquids for 2-3 days afterwards.  If this consistency goes down well, advance your diet as tolerated back to solids over the next couple of days.    If you are in the red zone.  Please call [phone] and come back to the clinic right away so we can remove some fluid.    Green Zone:  Not hungry  Losing 1-2 lbs a week  Portion Control  Patient satisfaction  Red Zone:  reflux  vomiting  pain when eating, night cough  inability to eat solids  poor weight loss  Yellow Zone:  hungry between meals  not losing weight  eating larger portions   "

## 2023-09-11 NOTE — ASSESSMENT & PLAN NOTE
7/5/2007 S/P Lap Band Surgery     Pt with continued vomiting and chest discomfort stemming from too tight of band dating back to 2008.  Would like band removed.  Will do band adjustment today to take out all remaining fluid.  Pt is not interested to other bariatric operations.      Will send note to surgeon and .

## 2023-09-11 NOTE — PROGRESS NOTES
BARIATRIC SURGERY VISIT     9/11/2023      CHIEF COMPLAINT: Bariatric Surgery-Re-Establish Care Visit     Assessment & Plan   Problem List Items Addressed This Visit       Morbid obesity (H)     Pt is interested is weight management but her band is not assisting with this. She would be a good candidate for a GLP-1 agonist since she also in diabetic.  Not sure if this is affordable with her medicare.  Will have her see MTM to review.          Relevant Medications    JARDIANCE 10 MG TABS tablet    Other Relevant Orders    Med Therapy Management Referral    S/P lap band surgery - Primary     7/5/2007 S/P Lap Band Surgery     Pt with continued vomiting and chest discomfort stemming from too tight of band dating back to 2008.  Would like band removed.  Will do band adjustment today to take out all remaining fluid.  Pt is not interested to other bariatric operations.      Will send note to surgeon and .          Relevant Orders    Lap Band Adjustment - Clinic (Completed)    Postsurgical nonabsorption     Continue you multivitamin and Vitamin D.  Recommend she start Calcium twice daily.          Type 2 diabetes mellitus with hyperglycemia, without long-term current use of insulin (H)     HgA1C elevated 8.1.  ON metformin and Jardiance.  Has stopped Jardiance due to cost. Will see MTM to review medications.  Has f/unit(s) with PCP in October.           Relevant Medications    JARDIANCE 10 MG TABS tablet    Other Relevant Orders    Med Therapy Management Referral     Other Visit Diagnoses       Fitting and adjustment of gastric lap band        Relevant Orders    Lap Band Adjustment - Clinic (Completed)            After evaluation, we elected to adjust the gastric band. Risk, benefits, and alternatives were reviewed before a consent was signed. Pt wished to proceed.      PROCEDURE: Adjustment of gastric band     PROCEDURE DETAILS: In the clinic exam room, the patient was placed in supine position on the exam  "table. The area over the access port was prepped with an alcohol swab, gloves were donned, and a Laguna needle and syringe were directed into the port under palpation guidance. All remaining (approx.1 ml) saline was removed from the port. Access needle was withdrawn and bandaid was applied. Patient sat up and drank 4 ounces of lukewarm water without difficulty. Pt should return to a liquid diet and advance as tolerated.  Pt left home in a stable and ambulatory condition.       PATIENT INSTRUCTIONS:  Will submit for pre-Approval for band removal tomorrow  Please call [phone] to schedule with medical therapy management with Lauren Bloch, [facility] Pharmacist to look at your diabetes medications and obesity medication options.     FOLLOW-UP:  Arturo  will call to schedule surgery once we receive approval from insurance.  Return to clinic for medical weight management when you return to [place] from your winter break if desired.     61 minutes spent on the date of the encounter doing chart review, history and exam, result review, counseling, developing plan of care, documentation, and further activities as noted      HISTORY OF PRESENT ILLNESS: I had the pleasure of seeing, Day Sahu, in my post-bariatric surgery assessment clinic today to re-establish care. She had a LapBand placed by Dr. Rodriges on 7/5/2007.  Has had problems with too much restriction ever since. She states most days she either vomits after eating has to walk around up to several hours before food travels through the band.  She gets so frustrated, she often cries.  It interferes with her activities of daily living.   Would like to have Lap band removed.  She had her last adjustment back on 5/24/2013 and states all her remaining fluid was removed.  Records show  0.75 ml of fluid was removed.  Despite this, her band continued to be in the \"red Zone.\" States she did not return because all the fluid was removed.      BARIATRIC SURGERY HX: " "   Surgery: Lap Band Adjustable Gastric Band   DOS:7/5/2007   Surgeon:  Dr. Ferdinand Rodriges    WEIGHT METRICS:  Body mass index is 43.08 kg/m .   Current Weight: 251 lb (113.9 kg)     Wt Readings from Last 10 Encounters:   09/11/23 251 lb (113.9 kg)   08/19/19 264 lb (119.7 kg)   09/28/18 264 lb 9.6 oz (120 kg)   07/19/18 261 lb 8 oz (118.6 kg)   05/24/13 257 lb 4.8 oz (116.7 kg)         Patient is taking the following bariatric postoperative vitamins:  1 Complete multivitamins for 55 Plus  5000 International Units of Vitamin D daily  Occasional Calcium  Takes Co Q 10.      OBESITY RELATED CONDITIONS:  Hyperlipidemia  Type II diabetes     SOCIAL HISTORY:  Pt denies smoking.      Diet Recall:  Breakfast: Oatmeal  Lunch:  Soup  Suppers:  Taco (Ground beef, chicken, fish, seafood) Can't have hamburger.  Can't have tomato- has to take off skin.      REVIEW OF SYSTEMS:     GI:  Nausea-  Yes  Vomiting- Yes  Diarrhea- none  Constipation- none  Dysphagia-Yes  Abdominal Pain- Denies   Heartburn-Denies     BAND ROS:  Hungry between meals:    Yes  Eating between meals:    Yes, snack between meals   Eat >1 cup of food at meals:    No  Not losing 1-2 lbs a week:    Yes  Not feeling sense of restriction:   No, Can't eat steak, roast, anything dense.  Even salad bothers her.      Have pain when swallowing:    No  Haves vomiting after eating and nausea Yes  Feeling of food stuck.  Has to walk around for until food goes through   Have night cough    Yes  Making poor food choices:    Yes  Unable to eat chicken, steak and bread: Yes    Is pregnant      N/A  Will be traveling to remote areas   No- does go to [place] in November.   Will have surgery soon.   No      LABS/IMAGING/MEDICAL RECORDS REVIEW:   8/9/2023 HgA1C 8.1  Band Surgery Op Note 7/5/2007  Initial Consult Note 1/16/2007    PHYSICAL EXAMINATION:   /75 (BP Location: Left arm, Patient Position: Chair, Cuff Size: Adult Large)   Pulse 77   Ht 5' 4\" (1.626 m)   Wt 251 lb " (113.9 kg)   SpO2 94%   BMI 43.08 kg/m    GENERAL: Alert and oriented x3. NAD  HEART: No murmurs, rubs or gallops, Regular rate and rhythm  LUNGS: Breathing unlabored, Lung sounds clear to auscultation bilaterally  ABDOMEN: soft; nontender; nondistended, incision well healed. No hernia  EXTREMITIES: No LE edema bilaterally, Gait normal  SKIN: No intertriginous irritation or rash    COUNSELING PROVIDED:  We reviewed the important post op bariatric recommendations:  eating 3 meals daily  eating protein first, getting >60gm protein daily  eating slowly, chewing food well  avoiding/limiting calorie containing beverages  avoiding fluids 30 minutes before, during, and after meals  limiting restaurant or cafeteria eating to twice a week or less  Pt reminded to avoid marginal ulcers she should avoid tobacco at all, alcohol in excess, caffeine, and NSAIDS (unless indicated for cardioprotection or otherwise and opposed by a PPI).  Pt encouraged to establish and maintain a consistent physical activity routine, 6-8 hours of restorative sleep each night and optimal stress management.  Pt counseled on the importance of life long vitamin supplementation and life long follow up.

## 2023-09-18 ENCOUNTER — VIRTUAL VISIT (OUTPATIENT)
Dept: SURGERY | Facility: CLINIC | Age: 78
End: 2023-09-18
Payer: COMMERCIAL

## 2023-09-18 DIAGNOSIS — E66.01 MORBID OBESITY (H): Primary | ICD-10-CM

## 2023-09-18 NOTE — PROGRESS NOTES
Virtual Visit Details    Type of service:  Video Visit   Video Start Time:  11 AM  Video End Time:11:53 AM    Originating Location (pt. Location): Home    Distant Location (provider location):  Off-site  Platform used for Video Visit: Breonna Bernstein would like to have her LAGB removed (placed in ) due to concerns around limited ability to eat full meals and discomfort. She has struggled with her weight since college after her father  and now is concerned about various comorbidities. She has a history of depression and is currently medicated. She will follow up and complete psychological testing (insurance is requesting this). F50.9; E66.01

## 2023-09-20 DIAGNOSIS — Z98.84 HX OF LAPAROSCOPIC GASTRIC BANDING: Primary | ICD-10-CM

## 2023-10-02 ENCOUNTER — VIRTUAL VISIT (OUTPATIENT)
Dept: SURGERY | Facility: CLINIC | Age: 78
End: 2023-10-02
Payer: COMMERCIAL

## 2023-10-02 ENCOUNTER — TRANSFERRED RECORDS (OUTPATIENT)
Dept: HEALTH INFORMATION MANAGEMENT | Facility: CLINIC | Age: 78
End: 2023-10-02

## 2023-10-02 DIAGNOSIS — E66.01 MORBID OBESITY (H): Primary | ICD-10-CM

## 2023-10-02 NOTE — PROGRESS NOTES
Virtual Visit Details    Type of service:  Video Visit   Video Start Time:  1:25 PM  Video End Time:2:03 PM    Originating Location (pt. Location): Home    Distant Location (provider location):  Off-site  Platform used for Video Visit: Zoom (Telehealth)    Day has made quality changes in eating and lifestyle and appears more mindful about her eating. She is now ready to proceed with the removal of the LAGB. F50.9; h/o F32.9; E66.01
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
## 2023-10-09 RX ORDER — UBIDECARENONE 100 MG
100 CAPSULE ORAL DAILY
COMMUNITY

## 2023-10-09 RX ORDER — ASPIRIN 81 MG/1
162 TABLET ORAL DAILY
COMMUNITY

## 2023-10-09 RX ORDER — ALBUTEROL SULFATE 0.83 MG/ML
2.5 SOLUTION RESPIRATORY (INHALATION) 3 TIMES DAILY PRN
COMMUNITY

## 2023-10-10 ENCOUNTER — HOSPITAL ENCOUNTER (OUTPATIENT)
Facility: CLINIC | Age: 78
Discharge: HOME | End: 2023-10-10
Attending: SURGERY | Admitting: SURGERY
Payer: COMMERCIAL

## 2023-10-10 ENCOUNTER — ANESTHESIA EVENT (OUTPATIENT)
Dept: SURGERY | Facility: CLINIC | Age: 78
End: 2023-10-10
Payer: COMMERCIAL

## 2023-10-10 ENCOUNTER — ANESTHESIA (OUTPATIENT)
Dept: SURGERY | Facility: CLINIC | Age: 78
End: 2023-10-10
Payer: COMMERCIAL

## 2023-10-10 ENCOUNTER — APPOINTMENT (OUTPATIENT)
Dept: SURGERY | Facility: PHYSICIAN GROUP | Age: 78
End: 2023-10-10
Payer: COMMERCIAL

## 2023-10-10 VITALS
RESPIRATION RATE: 18 BRPM | DIASTOLIC BLOOD PRESSURE: 80 MMHG | BODY MASS INDEX: 43.71 KG/M2 | SYSTOLIC BLOOD PRESSURE: 112 MMHG | WEIGHT: 256 LBS | HEART RATE: 67 BPM | TEMPERATURE: 97.4 F | HEIGHT: 64 IN | OXYGEN SATURATION: 94 %

## 2023-10-10 DIAGNOSIS — G89.18 POST-OP PAIN: Primary | ICD-10-CM

## 2023-10-10 LAB
GLUCOSE BLDC GLUCOMTR-MCNC: 178 MG/DL (ref 70–99)
PATH REPORT.COMMENTS IMP SPEC: NORMAL
PATH REPORT.COMMENTS IMP SPEC: NORMAL
PATH REPORT.FINAL DX SPEC: NORMAL
PATH REPORT.GROSS SPEC: NORMAL
PATH REPORT.RELEVANT HX SPEC: NORMAL
PHOTO IMAGE: NORMAL

## 2023-10-10 PROCEDURE — 88300 SURGICAL PATH GROSS: CPT | Mod: TC | Performed by: SURGERY

## 2023-10-10 PROCEDURE — 710N000009 HC RECOVERY PHASE 1, LEVEL 1, PER MIN: Performed by: SURGERY

## 2023-10-10 PROCEDURE — 272N000001 HC OR GENERAL SUPPLY STERILE: Performed by: SURGERY

## 2023-10-10 PROCEDURE — 258N000003 HC RX IP 258 OP 636: Performed by: ANESTHESIOLOGY

## 2023-10-10 PROCEDURE — 43774 LAP RMVL GASTR ADJ ALL PARTS: CPT | Mod: AS | Performed by: PHYSICIAN ASSISTANT

## 2023-10-10 PROCEDURE — 999N000141 HC STATISTIC PRE-PROCEDURE NURSING ASSESSMENT: Performed by: SURGERY

## 2023-10-10 PROCEDURE — 250N000009 HC RX 250: Performed by: NURSE ANESTHETIST, CERTIFIED REGISTERED

## 2023-10-10 PROCEDURE — 258N000001 HC RX 258: Performed by: SURGERY

## 2023-10-10 PROCEDURE — 250N000025 HC SEVOFLURANE, PER MIN: Performed by: SURGERY

## 2023-10-10 PROCEDURE — 250N000009 HC RX 250: Performed by: SURGERY

## 2023-10-10 PROCEDURE — 250N000011 HC RX IP 250 OP 636: Performed by: SURGERY

## 2023-10-10 PROCEDURE — 360N000077 HC SURGERY LEVEL 4, PER MIN: Performed by: SURGERY

## 2023-10-10 PROCEDURE — 710N000012 HC RECOVERY PHASE 2, PER MINUTE: Performed by: SURGERY

## 2023-10-10 PROCEDURE — 250N000011 HC RX IP 250 OP 636: Performed by: NURSE ANESTHETIST, CERTIFIED REGISTERED

## 2023-10-10 PROCEDURE — 88300 SURGICAL PATH GROSS: CPT | Mod: 26 | Performed by: PATHOLOGY

## 2023-10-10 PROCEDURE — 258N000003 HC RX IP 258 OP 636: Performed by: NURSE ANESTHETIST, CERTIFIED REGISTERED

## 2023-10-10 PROCEDURE — 82962 GLUCOSE BLOOD TEST: CPT

## 2023-10-10 PROCEDURE — 370N000017 HC ANESTHESIA TECHNICAL FEE, PER MIN: Performed by: SURGERY

## 2023-10-10 PROCEDURE — 43774 LAP RMVL GASTR ADJ ALL PARTS: CPT | Performed by: SURGERY

## 2023-10-10 RX ORDER — OXYCODONE HYDROCHLORIDE 5 MG/1
5-10 TABLET ORAL EVERY 4 HOURS PRN
Qty: 10 TABLET | Refills: 0 | Status: SHIPPED | OUTPATIENT
Start: 2023-10-10 | End: 2023-10-25

## 2023-10-10 RX ORDER — PROPOFOL 10 MG/ML
INJECTION, EMULSION INTRAVENOUS CONTINUOUS PRN
Status: DISCONTINUED | OUTPATIENT
Start: 2023-10-10 | End: 2023-10-10

## 2023-10-10 RX ORDER — ONDANSETRON 2 MG/ML
INJECTION INTRAMUSCULAR; INTRAVENOUS PRN
Status: DISCONTINUED | OUTPATIENT
Start: 2023-10-10 | End: 2023-10-10

## 2023-10-10 RX ORDER — ONDANSETRON 2 MG/ML
4 INJECTION INTRAMUSCULAR; INTRAVENOUS EVERY 30 MIN PRN
Status: DISCONTINUED | OUTPATIENT
Start: 2023-10-10 | End: 2023-10-10 | Stop reason: HOSPADM

## 2023-10-10 RX ORDER — BUPIVACAINE HYDROCHLORIDE 2.5 MG/ML
INJECTION, SOLUTION INFILTRATION; PERINEURAL PRN
Status: DISCONTINUED | OUTPATIENT
Start: 2023-10-10 | End: 2023-10-10 | Stop reason: HOSPADM

## 2023-10-10 RX ORDER — FENTANYL CITRATE 0.05 MG/ML
25 INJECTION, SOLUTION INTRAMUSCULAR; INTRAVENOUS EVERY 5 MIN PRN
Status: DISCONTINUED | OUTPATIENT
Start: 2023-10-10 | End: 2023-10-10 | Stop reason: HOSPADM

## 2023-10-10 RX ORDER — PROPOFOL 10 MG/ML
INJECTION, EMULSION INTRAVENOUS PRN
Status: DISCONTINUED | OUTPATIENT
Start: 2023-10-10 | End: 2023-10-10

## 2023-10-10 RX ORDER — ONDANSETRON 4 MG/1
4 TABLET, ORALLY DISINTEGRATING ORAL EVERY 30 MIN PRN
Status: DISCONTINUED | OUTPATIENT
Start: 2023-10-10 | End: 2023-10-10 | Stop reason: HOSPADM

## 2023-10-10 RX ORDER — LIDOCAINE HYDROCHLORIDE 20 MG/ML
INJECTION, SOLUTION INFILTRATION; PERINEURAL PRN
Status: DISCONTINUED | OUTPATIENT
Start: 2023-10-10 | End: 2023-10-10

## 2023-10-10 RX ORDER — HYDROMORPHONE HYDROCHLORIDE 1 MG/ML
INJECTION, SOLUTION INTRAMUSCULAR; INTRAVENOUS; SUBCUTANEOUS PRN
Status: DISCONTINUED | OUTPATIENT
Start: 2023-10-10 | End: 2023-10-10

## 2023-10-10 RX ORDER — HYDROMORPHONE HCL IN WATER/PF 6 MG/30 ML
0.2 PATIENT CONTROLLED ANALGESIA SYRINGE INTRAVENOUS EVERY 5 MIN PRN
Status: DISCONTINUED | OUTPATIENT
Start: 2023-10-10 | End: 2023-10-10 | Stop reason: HOSPADM

## 2023-10-10 RX ORDER — HYDROMORPHONE HCL IN WATER/PF 6 MG/30 ML
0.4 PATIENT CONTROLLED ANALGESIA SYRINGE INTRAVENOUS EVERY 5 MIN PRN
Status: DISCONTINUED | OUTPATIENT
Start: 2023-10-10 | End: 2023-10-10 | Stop reason: HOSPADM

## 2023-10-10 RX ORDER — DEXMEDETOMIDINE HYDROCHLORIDE 4 UG/ML
INJECTION, SOLUTION INTRAVENOUS PRN
Status: DISCONTINUED | OUTPATIENT
Start: 2023-10-10 | End: 2023-10-10

## 2023-10-10 RX ORDER — SODIUM CHLORIDE, SODIUM LACTATE, POTASSIUM CHLORIDE, CALCIUM CHLORIDE 600; 310; 30; 20 MG/100ML; MG/100ML; MG/100ML; MG/100ML
INJECTION, SOLUTION INTRAVENOUS CONTINUOUS PRN
Status: DISCONTINUED | OUTPATIENT
Start: 2023-10-10 | End: 2023-10-10

## 2023-10-10 RX ORDER — FENTANYL CITRATE 0.05 MG/ML
50 INJECTION, SOLUTION INTRAMUSCULAR; INTRAVENOUS EVERY 5 MIN PRN
Status: DISCONTINUED | OUTPATIENT
Start: 2023-10-10 | End: 2023-10-10 | Stop reason: HOSPADM

## 2023-10-10 RX ORDER — SIMVASTATIN 40 MG
40 TABLET ORAL AT BEDTIME
COMMUNITY

## 2023-10-10 RX ORDER — DEXAMETHASONE SODIUM PHOSPHATE 4 MG/ML
INJECTION, SOLUTION INTRA-ARTICULAR; INTRALESIONAL; INTRAMUSCULAR; INTRAVENOUS; SOFT TISSUE PRN
Status: DISCONTINUED | OUTPATIENT
Start: 2023-10-10 | End: 2023-10-10

## 2023-10-10 RX ORDER — CEFAZOLIN SODIUM/WATER 2 G/20 ML
2 SYRINGE (ML) INTRAVENOUS
Status: COMPLETED | OUTPATIENT
Start: 2023-10-10 | End: 2023-10-10

## 2023-10-10 RX ORDER — MAGNESIUM HYDROXIDE 1200 MG/15ML
LIQUID ORAL PRN
Status: DISCONTINUED | OUTPATIENT
Start: 2023-10-10 | End: 2023-10-10 | Stop reason: HOSPADM

## 2023-10-10 RX ORDER — SODIUM CHLORIDE, SODIUM LACTATE, POTASSIUM CHLORIDE, CALCIUM CHLORIDE 600; 310; 30; 20 MG/100ML; MG/100ML; MG/100ML; MG/100ML
INJECTION, SOLUTION INTRAVENOUS CONTINUOUS
Status: DISCONTINUED | OUTPATIENT
Start: 2023-10-10 | End: 2023-10-10 | Stop reason: HOSPADM

## 2023-10-10 RX ORDER — OXYCODONE HYDROCHLORIDE 5 MG/1
5 TABLET ORAL
Status: DISCONTINUED | OUTPATIENT
Start: 2023-10-10 | End: 2023-10-10 | Stop reason: HOSPADM

## 2023-10-10 RX ORDER — AMOXICILLIN 250 MG
1-2 CAPSULE ORAL 2 TIMES DAILY PRN
Qty: 20 TABLET | Refills: 0 | Status: SHIPPED | OUTPATIENT
Start: 2023-10-10 | End: 2023-10-25

## 2023-10-10 RX ORDER — FENTANYL CITRATE 50 UG/ML
INJECTION, SOLUTION INTRAMUSCULAR; INTRAVENOUS PRN
Status: DISCONTINUED | OUTPATIENT
Start: 2023-10-10 | End: 2023-10-10

## 2023-10-10 RX ADMIN — SODIUM CHLORIDE, POTASSIUM CHLORIDE, SODIUM LACTATE AND CALCIUM CHLORIDE: 600; 310; 30; 20 INJECTION, SOLUTION INTRAVENOUS at 08:07

## 2023-10-10 RX ADMIN — Medication 2 G: at 08:11

## 2023-10-10 RX ADMIN — PROPOFOL 30 MCG/KG/MIN: 10 INJECTION, EMULSION INTRAVENOUS at 08:24

## 2023-10-10 RX ADMIN — DEXMEDETOMIDINE HYDROCHLORIDE 8 MCG: 200 INJECTION INTRAVENOUS at 08:59

## 2023-10-10 RX ADMIN — ROCURONIUM BROMIDE 10 MG: 50 INJECTION, SOLUTION INTRAVENOUS at 08:47

## 2023-10-10 RX ADMIN — SUGAMMADEX 200 MG: 100 INJECTION, SOLUTION INTRAVENOUS at 10:03

## 2023-10-10 RX ADMIN — SODIUM CHLORIDE, POTASSIUM CHLORIDE, SODIUM LACTATE AND CALCIUM CHLORIDE: 600; 310; 30; 20 INJECTION, SOLUTION INTRAVENOUS at 11:34

## 2023-10-10 RX ADMIN — DEXAMETHASONE SODIUM PHOSPHATE 4 MG: 4 INJECTION, SOLUTION INTRA-ARTICULAR; INTRALESIONAL; INTRAMUSCULAR; INTRAVENOUS; SOFT TISSUE at 08:17

## 2023-10-10 RX ADMIN — ROCURONIUM BROMIDE 60 MG: 50 INJECTION, SOLUTION INTRAVENOUS at 08:11

## 2023-10-10 RX ADMIN — ROCURONIUM BROMIDE 10 MG: 50 INJECTION, SOLUTION INTRAVENOUS at 09:25

## 2023-10-10 RX ADMIN — DEXMEDETOMIDINE HYDROCHLORIDE 12 MCG: 200 INJECTION INTRAVENOUS at 08:57

## 2023-10-10 RX ADMIN — FENTANYL CITRATE 50 MCG: 50 INJECTION INTRAMUSCULAR; INTRAVENOUS at 08:11

## 2023-10-10 RX ADMIN — HYDROMORPHONE HYDROCHLORIDE 0.5 MG: 1 INJECTION, SOLUTION INTRAMUSCULAR; INTRAVENOUS; SUBCUTANEOUS at 08:43

## 2023-10-10 RX ADMIN — ONDANSETRON 4 MG: 2 INJECTION INTRAMUSCULAR; INTRAVENOUS at 09:56

## 2023-10-10 RX ADMIN — PROPOFOL 200 MG: 10 INJECTION, EMULSION INTRAVENOUS at 08:11

## 2023-10-10 RX ADMIN — FENTANYL CITRATE 50 MCG: 50 INJECTION INTRAMUSCULAR; INTRAVENOUS at 08:38

## 2023-10-10 RX ADMIN — PHENYLEPHRINE HYDROCHLORIDE 100 MCG: 10 INJECTION INTRAVENOUS at 09:53

## 2023-10-10 RX ADMIN — LIDOCAINE HYDROCHLORIDE 60 MG: 20 INJECTION, SOLUTION INFILTRATION; PERINEURAL at 08:11

## 2023-10-10 ASSESSMENT — LIFESTYLE VARIABLES: TOBACCO_USE: 1

## 2023-10-10 ASSESSMENT — ACTIVITIES OF DAILY LIVING (ADL)
ADLS_ACUITY_SCORE: 35

## 2023-10-10 NOTE — ANESTHESIA PREPROCEDURE EVALUATION
Anesthesia Pre-Procedure Evaluation    Patient: Day Sahu   MRN: [id number] : 1945        Procedure : Procedure(s):  REMOVAL, GASTRIC BAND, ADJUSTABLE, LAPAROSCOPIC          Past Medical History:   Diagnosis Date    Arthritis     OA-knees, hands, hips    Asthma     Chronic rhinitis     Depression     Diabetes (H)     TYPE 2    Dyslipidemia     Heartburn     Hyperlipidemia     Hypertension     Malabsorption     Morbid obesity (H)     Restless leg syndrome     Sleep apnea     CPAP    Vitamin D deficiency       Past Surgical History:   Procedure Laterality Date    APPENDECTOMY      ARTHRODESIS FINGER(S) Left 2018    Procedure: ARTHRODESIS FINGER(S);  LEFT MIDDLE PROXIMAL INTERPHALANGEAL JOINT FUSION AND LEFT RING FINGER PROXIMAL INTERPHALANGEAL JOINT DEBRIDEMENT;  Surgeon: Zev Howell MD;  Location:  OR    ARTHROPLASTY KNEE Right 2019    Procedure: RIGHT TOTAL KNEE ARTHROPLASTY ( HORNER AND NEPHEW )^;  Surgeon: Mick Rodgers MD;  Location:  OR    COLONOSCOPY      GYN SURGERY      D&C    IRRIGATION AND DEBRIDEMENT FINGER, COMBINED Left 2018    Procedure: COMBINED IRRIGATION AND DEBRIDEMENT FINGER;;  Surgeon: Zev Howell MD;  Location:  OR    LAP ADJUSTABLE GASTRIC BAND      ORTHOPEDIC SURGERY      LEFT HAND SURGERY    ORTHOPEDIC SURGERY Left     TOTAL KNEE    REMOVE HARDWARE HAND Left 2018    Procedure: REMOVE HARDWARE HAND;  LEFT MIDDLE FINGER REMOVAL OF DEEP IMPLANT(K-WIRES);  Surgeon: Zev Howell MD;  Location: [facility]      Allergies   Allergen Reactions    Dust Mites Shortness Of Breath    Smoke. Difficulty breathing    Tramadol Other (See Comments) and Dizziness     Arm numbness, urine incontinence       Social History     Tobacco Use    Smoking status: Never    Smokeless tobacco: Never   Substance Use Topics    Alcohol use: Yes     Comment: 2-4 glasses of wine a week      Wt Readings from Last 1 Encounters:   10/10/23 116.1 kg (256 lb)         Anesthesia Evaluation            ROS/MED HX  ENT/Pulmonary:     (+) sleep apnea, uses CPAP,              tobacco use, Past use,    asthma                  Neurologic:       Cardiovascular:     (+)  hypertension- -   -  - -                                      METS/Exercise Tolerance:     Hematologic:       Musculoskeletal:   (+)  arthritis,             GI/Hepatic: Comment: S/p lap band removal   (-) GERD and liver disease   Renal/Genitourinary:    (-) renal disease   Endo:     (+)  type II DM,             Obesity,       Psychiatric/Substance Use:       Infectious Disease:       Malignancy:       Other:            Physical Exam    Airway        Mallampati: II   TM distance: < 3 FB   Neck ROM: full     Respiratory Devices and Support         Dental     Comment: chipped        Cardiovascular   cardiovascular exam normal          Pulmonary   pulmonary exam normal                OUTSIDE LABS:  CBC:   Lab Results   Component Value Date    WBC 6.3 07/22/2008    WBC 5.0 07/05/2007    HGB 11.2 (L) 08/20/2019    HGB 13.4 08/19/2019    HCT 42.7 07/22/2008    HCT 43.5 07/05/2007    PLT 15 (LL) 08/20/2019     08/19/2019     BMP:   Lab Results   Component Value Date     08/20/2019     08/19/2019    POTASSIUM 4.4 08/20/2019    POTASSIUM 3.8 08/19/2019    CHLORIDE 108 08/20/2019    CHLORIDE 107 08/19/2019    CO2 31 08/20/2019    CO2 28 08/19/2019    BUN 16 08/20/2019    BUN 17 08/19/2019    CR 0.55 08/20/2019    CR 0.55 08/19/2019     (H) 10/10/2023     (H) 08/20/2019     COAGS:   Lab Results   Component Value Date    INR 0.92 03/23/2007     POC:   Lab Results   Component Value Date     (H) 09/28/2018     HEPATIC:   Lab Results   Component Value Date    ALBUMIN 4.1 03/23/2007    PROTTOTAL 6.9 03/23/2007    ALT 21 03/23/2007    AST 24 03/23/2007    ALKPHOS 80 03/23/2007    BILITOTAL 0.5 03/23/2007     OTHER:   Lab Results   Component Value Date    A1C 6.4 (A) 07/11/2018    FLORECITA 7.9 (L)  08/20/2019    TSH 2.03 03/23/2007       Anesthesia Plan    ASA Status:  2       Anesthesia Type: General.     - Airway: ETT              Consents    Anesthesia Plan(s) and associated risks, benefits, and realistic alternatives discussed. Questions answered and patient/representative(s) expressed understanding.     - Discussed:     - Discussed with:  Patient            Postoperative Care       PONV prophylaxis: Ondansetron (or other 5HT-3), Dexamethasone or Solumedrol, Background Propofol Infusion     Comments:                Giovanna Cortes

## 2023-10-10 NOTE — ANESTHESIA PROCEDURE NOTES
Airway       Patient location during procedure: OR       Procedure Start/Stop Times: 10/10/2023 8:13 AM  Staff -        CRNA: Live Kruger APRN CRNA       Performed By: CRNA  Consent for Airway        Urgency: elective  Indications and Patient Condition       Indications for airway management: laura-procedural       Induction type:intravenous       Mask difficulty assessment: 2 - vent by mask + OA or adjuvant +/- NMBA    Final Airway Details       Final airway type: endotracheal airway       Successful airway: ETT - single  Endotracheal Airway Details        ETT size (mm): 7.0       Successful intubation technique: video laryngoscopy       VL Blade Size: Arriaga 3       Grade View of Cords: 1       Adjucts: stylet       Position: Right       Measured from: gums/teeth       Secured at (cm): 21       Bite block used: None    Post intubation assessment        Placement verified by: capnometry, equal breath sounds and chest rise        Number of attempts at approach: 1       Number of other approaches attempted: 0       Secured with: silk tape       Ease of procedure: easy       Dentition: Intact and Unchanged    Medication(s) Administered   Medication Administration Time: 10/10/2023 8:13 AM

## 2023-10-10 NOTE — OP NOTE
Preoperative diagnosis: Maladaptive eating and dysphagia status post lap band.   Postoperative diagnosis:   1.  Maladaptive eating and dysphagia status post lap band  2.  Intra-abdominal adhesions  3.  Umbilical hernia  Operative procedure:   1. Laparoscopic removal of lap band device  2.  Lysis of adhesions.    3.  Umbilical hernia repair (1.5 cm)    Surgeon: Ferdinand Rodriges M.D.   Assistant:Kenneth Sloan PA-C, The physicians assistant was medically necessary for their expertise in camera management, suctioning, suturing, and retraction.  Anesthesia: GETA   EBL: Less than 15 mL.   Events:   After induction of general endotracheal anesthesia Day Sahu abdomen was prepped and draped in the usual sterile fashion. With the direct visualization trocar in the left upper quadrant the abdomen was entered and pneumoperitoneum was established.  A total of 3 additional trocars were placed across the mid abdomen.  Multiple adhesions from the omentum to the abdominal wall and to the liver were taken down onto we could visualize the Lap-Band device.  We then began dissecting the folded over the band stomach, in doing so we identified and transected the anchoring sutures.  This allowed good liberalization of the gastric fundus.  At this point the Lap-Band device was transected and removed from its channel.  We then transected the scar tissue that was located between the inner diameter of the band and the surface of the stomach.  We then lifted up that stripped of scar tissue away from the anterolateral aspect of the stomach achieving complete liberalization of the gastric fundus.  The stomach was occluded distally and insufflated with ga via the OG tube.  The stomach distended nicely all the way throughout including whether the band had been located.  No evidence of air leak no injuries noted.  We then took the Lap-Band device, its tubing, and the implantable port out from the patient's abdomen and subcutaneous tissue.   The capsule that had formed around the subcutaneous port was also removed with all anchoring sutures.  This was sent to pathology.  The umbilical hernia that we encountered was repaired with multiple interrupted 0 Vicryl sutures.  The fascial defect at the site of the band removal was closed with multiple interrupted 0 Vicryl sutures.  Trocars were removed under direct visualization without evidence of bleeding. Pneumoperitoneum was released and skin approximated with absorbable suture. Steri-Strips and dressing applied. No immediate complications. Counts reported correct.

## 2023-10-10 NOTE — DISCHARGE INSTRUCTIONS
Same Day Surgery Discharge Instructions for  Sedation and General Anesthesia     It's not unusual to feel dizzy, light-headed or faint for up to 24 hours after surgery or while taking pain medication.  If you have these symptoms: sit for a few minutes before standing and have someone assist you when you get up to walk or use the bathroom.    You should rest and relax for the next 24 hours. We recommend you make arrangements to have an adult stay with you for at least 24 hours after your discharge.  Avoid hazardous and strenuous activity.    DO NOT DRIVE any vehicle or operate mechanical equipment for 24 hours following the end of your surgery.  Even though you may feel normal, your reactions may be affected by the medication you have received.    Do not drink alcoholic beverages for 24 hours following surgery.     Slowly progress to your regular diet as you feel able. It's not unusual to feel nauseated and/or vomit after receiving anesthesia.  If you develop these symptoms, drink clear liquids (apple juice, ginger ale, broth, 7-up, etc. ) until you feel better.  If your nausea and vomiting persists for 24 hours, please notify your surgeon.      All narcotic pain medications, along with inactivity and anesthesia, can cause constipation. Drinking plenty of liquids and increasing fiber intake will help.    For any questions of a medical nature, call your surgeon.    Do not make important decisions for 24 hours.    If you had general anesthesia, you may have a sore throat for a couple of days related to the breathing tube used during surgery.  You may use Cepacol lozenges to help with this discomfort.  If it worsens or if you develop a fever, contact your surgeon.     If you feel your pain is not well managed with the pain medications prescribed by your surgeon, please contact your surgeon's office to let them know so they can address your concerns.      **Because you had anesthesia today and your history of sleep  apnea, it is extremely important that you use your CPAP machine for the next 24 hours while napping or sleeping.**             [facility]  Discharge Instructions: Post-Operative Laparoscopic Removal of Laparoscopic Band with Primary Repair of Umbilical Hernia    ACTIVITY  Expect to feel tired after your surgery.  This will gradually resolve.    Take frequent, short walks and increase your activity gradually.    Avoid strenuous physical activity or heavy lifting greater than 15-20 lbs. for 3 weeks.  You may climb stairs.  You may drive without restrictions when you are not using any prescription pain medication and feel comfortable in a car.    WOUND CARE  You may remove your outer dressing or Band-Aids and shower 48 hours after the surgery.  Pat your incisions dry and leave them open to air.  Re-apply dressing (Band-Aids or gauze/tape) as needed for comfort or drainage.  You may have steri-strips (looks like white tape) on your incision.  You may peel off the steri-strips 2 weeks after your surgery if they have not peeled off on their own.   Do not soak your incisions in a tub or pool for 2 weeks.   Do not apply any lotions, creams, or ointments to your incisions.  A ridge under your incisions is normal and will gradually resolve.    DIET  Start with liquids, then gradually resume your normal diet as tolerated.    Drink plenty of fluids to stay hydrated.    PAIN  Expect some tenderness and discomfort at the incision sites.  Use the prescribed pain medication at your discretion.  Expect gradual resolution of your pain over several days.  You may take acetaminophen (Tylenol) instead of, or in addition to, your prescribed pain medication.    Do not drink alcohol or drive while you are taking pain medications.  You may apply ice to your incisions in 20 minute intervals as needed for the next 48 hours.  After that time, consider switching to heat if you prefer.    EXPECTATIONS  Pain  medications can cause constipation.  Limit use when possible.  Take over the counter stool softener/stimulant, such as Colace or Senna, 1-2 times a day with plenty of water.  You may take a mild over the counter laxative, such as Miralax or a suppository, as needed.  You may discontinue these medications once you are having regular bowel movements and/or are no longer taking your narcotic pain medication.    You may have shoulder or upper back discomfort due to the gas used in surgery.  This is temporary and should resolve in 48-72 hours.  Short, frequent walks may help with this.  If you are unable to urinate, or feel as though you are not emptying your bladder adequately, we recommend you call our office and/or seek care at an ER or Urgent Care facility if after hours.    FOLLOW UP  Return for follow up to see Dr Rodriges or Leigha Bradley PA-C in clinic in 2-3 weeks.      CALL OUR OFFICE -[phone] IF YOU HAVE:   Chills or fever above 101 F.  Increased redness, warmth, or drainage at your incisions.  Significant bleeding.  Pain not relieved by your pain medication or rest.  Increasing pain after the first 48 hours.  Any other concerns or questions.                          ** If you have questions or concerns about your procedure,  call Dr. Rodriges at [phone] **

## 2023-10-10 NOTE — ANESTHESIA POSTPROCEDURE EVALUATION
Patient: Day Sahu    Procedure: Procedure(s):  REMOVAL, GASTRIC BAND, ADJUSTABLE, LAPAROSCOPIC, LYSIS OF ADHESIONS AND UMBILICAL HERNIA REPAIR       Anesthesia Type:  General    Note:  Disposition: Admission   Postop Pain Control: Uneventful            Sign Out: Well controlled pain   PONV: No   Neuro/Psych: Uneventful            Sign Out: Acceptable/Baseline neuro status   Airway/Respiratory: Uneventful            Sign Out: Acceptable/Baseline resp. status   CV/Hemodynamics: Uneventful            Sign Out: Acceptable CV status   Other NRE:    DID A NON-ROUTINE EVENT OCCUR? No           Last vitals:  Vitals Value Taken Time   /79 10/10/23 1229   Temp 36.7  C (98  F) 10/10/23 1130   Pulse 71 10/10/23 1229   Resp 19 10/10/23 1209   SpO2 84 % 10/10/23 1231   Vitals shown include unvalidated device data.    Electronically Signed By: Giovanna Cortes  October 10, 2023  1:59 PM

## 2023-10-10 NOTE — ANESTHESIA CARE TRANSFER NOTE
Patient: Day Sahu    Procedure: Procedure(s):  REMOVAL, GASTRIC BAND, ADJUSTABLE, LAPAROSCOPIC, LYSIS OF ADHESIONS AND UMBILICAL HERNIA REPAIR       Diagnosis: Hx of laparoscopic gastric banding [Z98.84]  Diagnosis Additional Information: No value filed.    Anesthesia Type:   General     Note:    Oropharynx: oropharynx clear of all foreign objects and spontaneously breathing  Level of Consciousness: drowsy  Oxygen Supplementation: face mask  Level of Supplemental Oxygen (L/min / FiO2): 8  Independent Airway: airway patency satisfactory and stable  Dentition: dentition unchanged  Vital Signs Stable: post-procedure vital signs reviewed and stable  Report to RN Given: handoff report given  Patient transferred to: PACU    Handoff Report: Identifed the Patient, Identified the Reponsible Provider, Reviewed the pertinent medical history, Discussed the surgical course, Reviewed Intra-OP anesthesia mangement and issues during anesthesia, Set expectations for post-procedure period and Allowed opportunity for questions and acknowledgement of understanding      Vitals:  Vitals Value Taken Time   /67 10/10/23 1022   Temp     Pulse 71 10/10/23 1025   Resp 13 10/10/23 1025   SpO2 98 % 10/10/23 1025   Vitals shown include unvalidated device data.    Electronically Signed By: INA Rodriguez CRNA  October 10, 2023  10:26 AM

## 2023-10-10 NOTE — INTERVAL H&P NOTE
"I have reviewed the surgical (or preoperative) H&P that is linked to this encounter, and examined the patient. There are no significant changes    Clinical Conditions Present on Arrival:  Clinically Significant Risk Factors Present on Admission                 # Drug Induced Platelet Defect: home medication list includes an antiplatelet medication  # Severe Obesity: Estimated body mass index is 43.94 kg/m  as calculated from the following:    Height as of this encounter: 1.626 m (5' 4\").    Weight as of this encounter: 116.1 kg (256 lb).       "

## 2023-10-25 ENCOUNTER — VIRTUAL VISIT (OUTPATIENT)
Dept: PHARMACY | Facility: CLINIC | Age: 78
End: 2023-10-25
Attending: PHYSICIAN ASSISTANT
Payer: COMMERCIAL

## 2023-10-25 DIAGNOSIS — E78.5 HYPERLIPIDEMIA LDL GOAL <100: ICD-10-CM

## 2023-10-25 DIAGNOSIS — E11.65 TYPE 2 DIABETES MELLITUS WITH HYPERGLYCEMIA, WITHOUT LONG-TERM CURRENT USE OF INSULIN (H): Primary | ICD-10-CM

## 2023-10-25 DIAGNOSIS — I10 BENIGN ESSENTIAL HYPERTENSION: ICD-10-CM

## 2023-10-25 DIAGNOSIS — J45.30 MILD PERSISTENT ASTHMA WITHOUT COMPLICATION: ICD-10-CM

## 2023-10-25 DIAGNOSIS — Z87.898 HX OF CHEST PAIN: ICD-10-CM

## 2023-10-25 DIAGNOSIS — M85.80 OSTEOPENIA, UNSPECIFIED LOCATION: ICD-10-CM

## 2023-10-25 DIAGNOSIS — G47.00 INSOMNIA, UNSPECIFIED TYPE: ICD-10-CM

## 2023-10-25 DIAGNOSIS — F32.A DEPRESSION, UNSPECIFIED DEPRESSION TYPE: ICD-10-CM

## 2023-10-25 DIAGNOSIS — Z98.84 BARIATRIC SURGERY STATUS: ICD-10-CM

## 2023-10-25 DIAGNOSIS — Z96.651 S/P TOTAL KNEE ARTHROPLASTY, RIGHT: ICD-10-CM

## 2023-10-25 PROCEDURE — 99607 MTMS BY PHARM ADDL 15 MIN: CPT | Mod: VID | Performed by: PHARMACIST

## 2023-10-25 PROCEDURE — 99605 MTMS BY PHARM NP 15 MIN: CPT | Mod: VID | Performed by: PHARMACIST

## 2023-10-25 RX ORDER — TURMERIC 400 MG
1 CAPSULE ORAL DAILY
COMMUNITY

## 2023-10-25 RX ORDER — TIZANIDINE 2 MG/1
2 TABLET ORAL AT BEDTIME
COMMUNITY
Start: 2023-10-06

## 2023-10-25 RX ORDER — MULTIVITAMIN,THERAPEUTIC
1 TABLET ORAL DAILY
COMMUNITY

## 2023-10-25 RX ORDER — METFORMIN HCL 500 MG
1000 TABLET, EXTENDED RELEASE 24 HR ORAL 2 TIMES DAILY WITH MEALS
COMMUNITY
Start: 2023-08-09

## 2023-10-25 RX ORDER — SENNOSIDES 8.6 MG
650 CAPSULE ORAL 2 TIMES DAILY
COMMUNITY

## 2023-10-25 ASSESSMENT — PAIN SCALES - GENERAL: PAINLEVEL: NO PAIN (0)

## 2023-10-25 NOTE — LETTER
October 25, 2023  Day Sahu  [address]  [place] [address]    Dear Ms. Sahu, [facility]     Thank you for talking with me on Oct 25, 2023 about your health and medications. As a follow-up to our conversation, I have included two documents:      Your Recommended To-Do List has steps you should take to get the best results from your medications.  Your Medication List will help you keep track of your medications and how to take them.    If you want to talk about these documents, please call Lauren T. Bloch, RPH at phone: [phone], Monday-Friday 8-4:30pm.    I look forward to working with you and your doctors to make sure your medications work well for you.    Sincerely,  Lauren T. Bloch, RPH  [facility] Pharmacist, [facility]

## 2023-10-25 NOTE — LETTER
"Recommended To-Do List      Prepared on: 10/25/2023       You can get the best results from your medications by completing the items on this \"To-Do List.\"      Bring your To-Do List when you go to your doctor. And, share it with your family or caregivers.    My To-Do List:  What we talked about: What I should do:   The importance of taking your medication as intended    Education: Restart calcium/vitamin D 1 tablet once daily           What we talked about: What I should do:   A medication that is not working    Pharmacist is reaching out to provider about switching from empagliflozin (JARDIANCE) to VICTOZA PEN.           What we talked about: What I should do:                     "

## 2023-10-25 NOTE — NURSING NOTE
Is the patient currently in the state of [place]? YES    Visit mode:VIDEO    If the visit is dropped, the patient can be reconnected by: VIDEO VISIT: Send to e-mail at: [email or web address]    Will anyone else be joining the visit? NO  (If patient encounters technical issues they should call [phone]    How would you like to obtain your AVS? MyChart    Are changes needed to the allergy or medication list? No, Pt stated no changes to allergies, and Pt stated no med changes    Reason for visit: Consult    Eve DE LA ROSA

## 2023-10-25 NOTE — PROGRESS NOTES
Virtual Visit Details    Type of service:  Video Visit     Originating Location (pt. Location): Home    Distant Location (provider location):  Off-site  Platform used for Video Visit: Breonna

## 2023-10-25 NOTE — PROGRESS NOTES
Medication Therapy Management (MTM) Encounter    ASSESSMENT:/                            Medication Adherence/Access: See below for considerations    Diabetes   A1c not at goal < 7%. Doesn't appear Jardiance has had an impact on blood sguars since starting in March as A1c has been increasing. Due to this and cost, may benefit from considering alternative option for weight loss. Due to Medicare, likely insurance will require starting Victoza first. Cost due to being in coverage gap is ~$250/month per Pharmacy Liaison, patient reports she is willing to pay this if also will help with weight management. However, patient may qualify for [employer] Patient Assistance Program. Therefore, will assist with starting process for this.     Hypertension   Blood pressure within goal .    Hyperlipidemia   Stable. Last LDL within goal < 100.     Chest Pain:   Stable.     Asthma:   Stable.     Depression:  Stable.     Insomnia:   Stable.     Gastric Band:   Improved. To monitor heartburn and discussed non-medication considerations to adjust for as of now.     Arthritis:   Stable for now. Due to age, would recommend monitoring for side effect with tizanidine given risk of drowsiness, cognition issues, etc.    Osteopenia:   Patient is not meeting RDI of calcium 1200mg/day. Patient is exceeding RDI of Vitamin D 1000 IU/day. Patient would benefit from:restarting calcium/vitamin D supplementation.    PLAN:                            Consider Victoza 0.6 mg daily for at least 1 week, then if tolerating increase to 1.2 mg daily. Then can have option to increase to 1.8 mg daily in future. Will help to have patient sign up for [employer] Patient Assistance Program.   Try to not eat within 3 hours of laying down.   Restart calcium/vitamin D daily     Follow-up: Return in about 8 weeks (around 12/20/2023) for Medication Therapy Management Pharmacist Visit, (scheduled today).  SUBJECTIVE/OBJECTIVE:                          Day Sahu  is a 77 year old female contacted via secure video for an initial visit. She was referred to me from MEGHAN Ng.      Reason for visit: comprehensive review of medications.    Allergies/ADRs: Reviewed in chart  Past Medical History: Reviewed in chart  Tobacco: She reports that she has never smoked. She has never used smokeless tobacco.  Alcohol: Less than 1 beverage / month    Medication Adherence/Access: no issues reported, but frustrated by cost of Jardiance.     Diabetes   Jardiance 25 mg daily in AM   Metformin ER 1000 mg twice daily   Aspirin 162 mg daily    Reports pays > $400 per 90 days for Jardiance. Started taking Jardiance in March and increased in June. Patient is not experiencing side effects. Reports metformin was increased in June to current dose. Doesn't want to take Jardiance anymore. No major issues of bleeding or bruising. Patient has no history of pancreatitis. Patient has no personal or family history of medullary thyroid carcinoma or MEN2.   Blood sugar monitoring: occasionally; Ranges: (patient reported) before lunch in late September 165, 150, 182, hasn't taken in October.   Current diabetes symptoms: none  Diet/Exercise: 2 meals per day, 1 snack per day. Gets protein with each meal.      Eye exam in the last 12 months? Yes- Date of last eye exam: 6/2023,  Location: [facility]    Foot exam: up to date  Urine Albumin: Care Everywhere 10/12/2022: 6 mg/g  Care Everywhere Lab: A1c  8/9/2023: 8.1%  5/25/2023: 7.9%  10/12/2022: 7.8%    Hypertension   Losartan 50 mg daily    Patient reports no current medication side effects  Patient does not self-monitor blood pressure. Has blood pressure monitor at home.       BP Readings from Last 3 Encounters:   10/10/23 112/80   09/11/23 131/75   08/20/19 124/63     Pulse Readings from Last 3 Encounters:   10/10/23 67   09/11/23 77   08/20/19 72     Hyperlipidemia   Simvastatin 40mg daily  Co-Q 10 daily     Reports all 3 brothers have each had heart  "attacks.   Patient reports no significant myalgias or other side effects.     Chest Pain:   Nitrostat as needed    Reports that when she was having issues of asthma issues and was coughing more, she was having greater chest pain but found out more related to asthma.     Asthma:   ICS - Flovent  mcg - 2 puffs once daily  Albuterol (ProAir/Ventolin/Proventil)  Albuterol Nebs     she finds that the Flovent works well as once daily and prefers not to use twice daily. Finds that asthma is under \"much better\" control.   Patient rinses their mouth after using steroid inhaler. Albuterol inhaler goes with her everywhere.   Patient reports no current medication side effects.      Triggers include: upper respiratory infections, dust mites, pollens, humidity, strong odors and fumes, and cold air.  Patient reports the following symptoms: none.    Depression:  Escitalopram 20mg once daily    Patient reports no current medication side effects.  Patient reports that her mood is stable on the regimen.     Insomnia:   Melatonin 5 mg nightly    She finds sleep is good with melatonin.     Gastric Band:   Had gastric band removal 10/10/2023 due to persistent vomiting. Reports that she can now eat a meal with no vomiting. Previously she would drag out meals and snack on less healthy foods or take hours to eat a meal. Now she finds that she is satisfied more quickly. Reports has noticed minor heartburn at bedtime, but does report eating closer to bedtime at times.     Arthritis:   Glucosamine daily  Fish oil daily  Acetaminophen 650 mg twice daily   Turmeric daily   Tizanidine 2 mg nightly     2 knee replacements. Has arthritis in hands and knees. She reports she gets muscle tension and fidgety legs especially fatigued does find the tizanidine helpful. No medication side effects reported. Finds the over-the-counter supplements/vitamins helpful for arthritis. Takes acetaminophen scheduled. NO NSAIDs.     Osteopenia: " "  Calcium/Vitamin D daily - ran out hasn't restarted  Vitamin D 5000 international unit(s) daily    Patient is not experiencing side effects.  DEXA History: 8/6/2021  FINDINGS:     Lumbar Spine (L1 and L2):   Average BMD (g/cm2): 0.827   T-score: -1.4     Left Forearm:   Average BMD (g/cm2): 0.580   T-score: -1.9     Left Hip Total:   Average BMD (g/cm2): 0.874   T-score: -0.6   Patient is getting  1 serving(s)/day of calcium in their diet.  Risk factors: post-menopausal  Last vitamin D level:  No results found for: \"VITDT\"    ----------------  Post Discharge Medication Reconciliation Status: discharge medications reconciled, continue medications without change.    I spent 60 minutes with this patient today. All changes were made via collaborative practice agreement with Leigha Bradley PA-C . A copy of the visit note was provided to the patient's provider(s).    A summary of these recommendations was sent via Teleport.    Lauren Bloch, PharmD, BCACP   Medication Therapy Management Pharmacist   [facility] [place]    Telemedicine Visit Details  Type of service:  Video Conference via [employer]  Start Time:  2:35 PM  End Time:  3:35 PM       Medication Therapy Recommendations  Osteopenia, unspecified location    Current Medication: calcium carbonate (OS-FLORECITA) 1500 (600 Ca) MG tablet   Rationale: Patient forgets to take - Adherence - Adherence   Recommendation: Provide Education   Status: Patient Agreed - Adherence/Education         Type 2 diabetes mellitus with hyperglycemia, without long-term current use of insulin (H)    Current Medication: empagliflozin (JARDIANCE) 25 MG TABS tablet   Rationale: Condition refractory to medication - Ineffective medication - Effectiveness   Recommendation: Change Medication - VICTOZA PEN 18 MG/3ML soln   Status: Accepted per Provider            "

## 2023-10-25 NOTE — PATIENT INSTRUCTIONS
"Recommendations from today's MTM visit:                                                    MTM (medication therapy management) is a service provided by a clinical pharmacist designed to help you get the most of out of your medicines.   Today we reviewed what your medicines are for, how to know if they are working, that your medicines are safe and how to make your medicine regimen as easy as possible.      Consider Victoza 0.6 mg daily for at least 1 week, then if tolerating increase to 1.2 mg daily. Then can have option to increase to 1.8 mg daily in future. Will help to have patient sign up for [employer] Patient Assistance Program - Pharmacy Liaison will be reaching out to you to assist with this.   Try to not eat within 3 hours of laying down.   Restart calcium/vitamin D daily     Follow-up: Return in about 8 weeks (around 12/20/2023) for Medication Therapy Management Pharmacist Visit, (scheduled today).    It was great speaking with you today.  I value your experience and would be very thankful for your time in providing feedback in our clinic survey. In the next few days, you may receive an email or text message from [employer] with a link to a survey related to your  clinical pharmacist.\"     To schedule another appointment with your MTM pharmacist, please call [facility] Scheduling at [phone].     My Clinical Pharmacist's contact information:                                                      Please feel free to contact me with any questions or concerns you have.      Lauren Bloch, PharmD  Medication Therapy Management Pharmacist   [facility]    Victoza Dosing:   Start Victoza: It is a subcutaneous injection that you inject once daily and titrate the dose slowly over time. Make sure inject the same time each day.   Week 1: Inject 0.6 mg once daily  Week 2: If tolerating, increase to 1.2 mg once daily   Week 3 and after: If " tolerating, increase to 1.8 mg once daily   *If you are having some nausea or other side effect(s) to where you are hesitant to move up to the next dose, stay at the same dose you are on for an additional week to see if side effect(s) improves/resolves. Make sure to take this time to hydrate and ensure you are drinking at least 64 oz water per day.     Victoza Administration Video:   Https://[email or web address].com/watch?[email or web address]    Victoza Storage and Stability:   Store new, unused Victoza pens in the refrigerator. After first use, store in a refrigerator or at room temperature. Pens in use should be thrown away after 30 days even if they still have Victoza left in them.     Victoza Common Side Effects:   Nausea, diarrhea, constipation, headache, tiredness (fatigue), dizziness, stomach upset/pain. Less commonly, Victoza can cause low blood sugar (symptoms: shaky, dizzy, sweaty, agitation). It is important to ensure that you are eating consistent meals and not skipping meals. Ensure you are getting at least 64 oz water daily.

## 2023-10-25 NOTE — LETTER
_  Medication List        Prepared on: 10/25/2023     Bring your Medication List when you go to the doctor, hospital, or   emergency room. And, share it with your family or caregivers.     Note any changes to how you take your medications.  Cross out medications when you no longer use them.    Medication How I take it Why I use it Prescriber   acetaminophen (TYLENOL) 650 MG CR tablet Take 650 mg by mouth 2 times daily Pain Patient Reported   albuterol (PROAIR HFA/PROVENTIL HFA/VENTOLIN HFA) 108 (90 Base) MCG/ACT Inhaler Inhale 2 puffs into the lungs every 4 hours as needed for shortness of breath, wheezing or cough Asthma Aundrea Lyadova V   albuterol (PROVENTIL) (2.5 MG/3ML) 0.083% neb solution Take 2.5 mg by nebulization 3 times daily as needed for shortness of breath, wheezing or cough Asthma Aundrea Lyadova V   aspirin 81 MG EC tablet Take 162 mg by mouth daily Heart Protection Aundrea Lyadova V   calcium carbonate (OS-FLORECITA) 1500 (600 Ca) MG tablet Take 600 mg by mouth daily Bone Health/Osteopenia Patient Reported   Cholecalciferol (D 5000) 5000 units TABS Take 1 tablet by mouth daily Osteopenia Aundrea Lyadova V   co-enzyme Q-10 100 MG CAPS capsule Take 100 mg by mouth daily Nutritional Support Aundrea Lyadova V   empagliflozin (JARDIANCE) 25 MG TABS tablet Take 25 mg by mouth every morning Type 2 Diabetes Aundrea Lyadova V   escitalopram (LEXAPRO) 20 MG tablet Take 20 mg by mouth daily Major Depressive Disorder Aundrea Lyadova V   fluticasone (FLOVENT HFA) 220 MCG/ACT Inhaler Inhale 2 puffs into the lungs at bedtime Asthma Aundrea Lyadova V   Glucosamine-Chondroitin (OSTEO BI-FLEX REGULAR STRENGTH) 250-200 MG TABS Take 2 tablets by mouth daily Joint Damage causing Pain and Loss of Function Patient Reported   losartan (COZAAR) 50 MG tablet Take 50 mg by mouth daily High Blood Pressure Disorder Aundrea Lyadova V   melatonin 5 MG tablet Take 5 mg by mouth nightly as needed for sleep Trouble Sleeping Patient  Reported   metFORMIN (GLUCOPHAGE XR) 500 MG 24 hr tablet Take 1,000 mg by mouth 2 times daily (with meals) Type 2 Diabetes Aundrea Lyadova V   Multiple Vitamins-Minerals (BALANCED CARE PO) Take 1 tablet by mouth daily Nutritional Support Patient Reported   multivitamin, therapeutic (THERA-VIT) TABS tablet Take 1 tablet by mouth daily Nutritional Support Patient Reported   nitroGLYcerin (NITROSTAT) 0.3 MG sublingual tablet Place 0.3 mg under the tongue daily as needed for chest pain  Chest Pain Aundrea Lyadova V   OVER-THE-COUNTER Take 750 mg by mouth daily (Omega 3) Nutritional Support Patient Reported   simvastatin (ZOCOR) 40 MG tablet Take 40 mg by mouth at bedtime High Cholesterol Aundrea Lyadova V   tiZANidine (ZANAFLEX) 2 MG tablet Take 2 mg by mouth at bedtime Muscle Spasticity; Musculoskeletal Pain Leandro Deep   Turmeric 400 MG CAPS Take 1 capsule by mouth daily Inflammation Patient Reported         Add new medications, over-the-counter drugs, herbals, vitamins, or  minerals in the blank rows below.    Medication How I take it Why I use it Prescriber                                      Allergies:      dust mites; smoke.        Side effects I have had:               Other Information:              My notes and questions:

## 2023-10-30 ENCOUNTER — TELEPHONE (OUTPATIENT)
Dept: SURGERY | Facility: CLINIC | Age: 78
End: 2023-10-30

## 2023-10-30 RX ORDER — LIRAGLUTIDE 6 MG/ML
INJECTION SUBCUTANEOUS
Qty: 36 ML | Refills: 3 | Status: SHIPPED | OUTPATIENT
Start: 2023-10-30 | End: 2024-11-12

## 2023-10-30 NOTE — TELEPHONE ENCOUNTER
Hello,    I have mychart messaged the patient the requirements for PAP through [employer].    Thank You!    Sav Okeefe [facility] Pharmacy Liaison  ealth [place]  [email or web address]  Phone: [phone]  Fax: [phone]

## 2023-10-30 NOTE — TELEPHONE ENCOUNTER
Patient was started on Victoza titration to 1.8 mg daily + markie pen needles. Routing to Pharmacy Liaison to help with starting [employer] Patient Assistance Program for both.     Lauren Bloch, PharmD, BCACP   Medication Therapy Management Pharmacist   [facility] [place]

## 2023-10-31 NOTE — TELEPHONE ENCOUNTER
Hello,    That's fine I just need a response so I can send the patient their section of the application. Unless I can send that to you as well, along with the provider's section.    Thank You!    Sav Okeefe [facility] Pharmacy Liaison  [facility] Trixie [email or web address]  Phone: [phone]  Fax: [phone]

## 2023-11-01 NOTE — TELEPHONE ENCOUNTER
Hello,    I've emailed you the provider's section. I will contact the patient soon here.    Thank You!    Sav Okeefe [facility] Pharmacy Liaison  ealth [place]  [email or web address]  Phone: [phone]  Fax: [phone]

## 2023-11-01 NOTE — TELEPHONE ENCOUNTER
Update: Spoke to patient today, I will be emailing her the application as soon as I get a response from her email. She is currently on vacation now so it'll be awhile.    Thank You!    Sav Okeefe [facility] Pharmacy Liaison  ealth [place] [email or web address]  Phone: [phone]  Fax: [phone]

## 2023-11-01 NOTE — TELEPHONE ENCOUNTER
Provider section you can send to me. She sometimes doesn't respond on mychart to may need to call, just AMOS.

## 2023-11-04 ENCOUNTER — HEALTH MAINTENANCE LETTER (OUTPATIENT)
Age: 78
End: 2023-11-04

## 2023-11-06 NOTE — PROGRESS NOTES
Update 11/6/2023:     Called patient to see where things were at with status of Victoza and [employer] Patient Assistance Program. Patient reports that she is still interested in program. She just moved back to [place] for the next months, unpacking now and plans to follow up with Pharmacy Liaison regarding [employer] Patient Assistance Program and filling out application information.     Lauren Bloch, PharmD, BCACP   Medication Therapy Management Pharmacist   [facility] [place]

## 2023-11-07 NOTE — TELEPHONE ENCOUNTER
Update: Still no update from patient.    Thank You!    Sav Okeefe [facility] Pharmacy Liaison  e[facility] [place]  [email or web address]  Phone: [phone]  Fax: [phone]

## 2023-11-07 NOTE — TELEPHONE ENCOUNTER
Update: emailed pt their section of the application.    Thank You!    Sav Okeefe [facility] Pharmacy Liaison  [facility]  [email or web address]  Phone: [phone]  Fax: [phone]

## 2023-11-14 NOTE — TELEPHONE ENCOUNTER
Update: Patient called asked about income and information about the form. Will check with patient when she faxes it out to [employer].    Thank You!    Sav Okeefe [facility] Pharmacy Liaison  [facility] Trixie [email or web address]  Phone: [phone]  Fax: [phone]

## 2023-11-27 NOTE — TELEPHONE ENCOUNTER
Hello,    I never received the provider's section. If it's completed, please make a copy and fax it out to [employer]. I ask that a copy is made because during the renewal period, applications tend to go missing.    Thank You!    Sav Okeefe [facility] Pharmacy Liaison  [email or web address]  Phone: [phone]  Fax: [phone]

## 2023-12-05 NOTE — TELEPHONE ENCOUNTER
FYI patient left message stating she faxed her patient sections to [employer].     Lauren Bloch, PharmD, BCACP   Medication Therapy Management Pharmacist   [facility] [place]

## 2023-12-05 NOTE — TELEPHONE ENCOUNTER
I will check the status tomorrow with Supervisor from [employer].    Thank You!    Sav Okeefe [facility] Pharmacy Liaison  [facility] Trixie  [email or web address]  Phone: [phone]  Fax: [phone]

## 2023-12-08 NOTE — TELEPHONE ENCOUNTER
Hello,    I received a call today that the patient has been approved for the year 2024. Her order will be placed on 01/01/2024 and it'll take 10 to 14 business days for patient's Victoza and Pen Needles to be shipped out.     Thank You!    Sav Okeefe [facility] Pharmacy Liaison  [facility] Trixie [email or web address]  Phone: [phone]  Fax: [phone]

## 2023-12-08 NOTE — TELEPHONE ENCOUNTER
It will be sent to what's on the application: [address] [facility] [address] [place] [id number]    Thank You!    Sav Okeefe [facility] Pharmacy Liaison  [facility] Trixie [email or web address]  Phone: [phone]  Fax: [phone]

## 2023-12-20 ENCOUNTER — TELEPHONE (OUTPATIENT)
Dept: SURGERY | Facility: CLINIC | Age: 78
End: 2023-12-20
Payer: COMMERCIAL

## 2023-12-20 ENCOUNTER — VIRTUAL VISIT (OUTPATIENT)
Dept: PHARMACY | Facility: CLINIC | Age: 78
End: 2023-12-20
Payer: COMMERCIAL

## 2023-12-20 DIAGNOSIS — E11.65 TYPE 2 DIABETES MELLITUS WITH HYPERGLYCEMIA, WITHOUT LONG-TERM CURRENT USE OF INSULIN (H): Primary | ICD-10-CM

## 2023-12-20 PROCEDURE — 99606 MTMS BY PHARM EST 15 MIN: CPT | Mod: VID | Performed by: PHARMACIST

## 2023-12-20 PROCEDURE — 99607 MTMS BY PHARM ADDL 15 MIN: CPT | Performed by: PHARMACIST

## 2023-12-20 RX ORDER — BLOOD SUGAR DIAGNOSTIC
STRIP MISCELLANEOUS
Qty: 100 STRIP | Refills: 11 | Status: SHIPPED | OUTPATIENT
Start: 2023-12-20 | End: 2023-12-22

## 2023-12-20 RX ORDER — BLOOD-GLUCOSE METER
1 EACH MISCELLANEOUS ONCE
Qty: 1 KIT | Refills: 0 | Status: SHIPPED | OUTPATIENT
Start: 2023-12-20 | End: 2023-12-22

## 2023-12-20 RX ORDER — LANCETS
EACH MISCELLANEOUS
Qty: 100 EACH | Refills: 11 | Status: SHIPPED | OUTPATIENT
Start: 2023-12-20 | End: 2023-12-22

## 2023-12-20 NOTE — PATIENT INSTRUCTIONS
"Recommendations from today's [facility] visit:                                                       Pharmacist to order glucometer supplies   Start Victoza when you obtain it from [employer] Patient Assistance Program   Start Victoza 0.6 mg subcutaneously once daily for at least 1 week, then if tolerating increase to 1.2 mg daily for at least 1 week, then if tolerating increase to 1.8 mg daily     Follow-up: Return in about 8 weeks (around 2/14/2024) for Medication Therapy Management Pharmacist Visit.    It was great speaking with you today.  I value your experience and would be very thankful for your time in providing feedback in our clinic survey. In the next few days, you may receive an email or text message from [employer] with a link to a survey related to your  clinical pharmacist.\"     To schedule another appointment with your [facility] pharmacist, please call [facility] Scheduling at [phone].     My Clinical Pharmacist's contact information:                                                      Please feel free to contact me with any questions or concerns you have.      Lauren Bloch, PharmD  Medication Therapy Management Pharmacist   [facility]    Victoza Dosing:   Start Victoza: It is a subcutaneous injection that you inject once daily and titrate the dose slowly over time. Make sure inject the same time each day.   Week 1: Inject 0.6 mg once daily  Week 2: If tolerating, increase to 1.2 mg once daily   Week 3 and after: If tolerating, increase to 1.8 mg once daily   *If you are having some nausea or other side effect(s) to where you are hesitant to move up to the next dose, stay at the same dose you are on for an additional week to see if side effect(s) improves/resolves. Make sure to take this time to hydrate and ensure you are drinking at least 64 oz water per day.     Victoza Administration Video: "   [email or web address]/watch?[email or web address]    Victoza Storage and Stability:   Store new, unused Victoza pens in the refrigerator. After first use, store in a refrigerator or at room temperature. Pens in use should be thrown away after 30 days even if they still have Victoza left in them.     Victoza Common Side Effects:   Nausea, diarrhea, constipation, headache, tiredness (fatigue), dizziness, stomach upset/pain. Less commonly, Victoza can cause low blood sugar (symptoms: shaky, dizzy, sweaty, agitation). It is important to ensure that you are eating consistent meals and not skipping meals. Ensure you are getting at least 64 oz water daily.

## 2023-12-20 NOTE — PROGRESS NOTES
Medication Therapy Management (MTM) Encounter    ASSESSMENT:                            Medication Adherence/Access: No issues identified    Diabetes:   A1c not within goal < 7% but improved from historical. Patient to start Victoza as planned when acquires from [employer]. For now, will send in testing supplies so she is able to test sugars, encouraged daily AM fasting sugars due to last random sugar reading until can get more information. Selected Victoza as patient did not wish to be impacted by shortage issues that have recently been occurring with Ozempic.     PLAN:                            Pharmacist to order glucometer supplies   Start Victoza when you obtain it from [employer]   Start Victoza 0.6 mg subcutaneously once daily for at least 1 week, then if tolerating increase to 1.2 mg daily for at least 1 week, then if tolerating increase to 1.8 mg daily     Follow-up: Return in about 8 weeks (around 2/14/2024) for Medication Therapy Management Pharmacist Visit.    SUBJECTIVE/OBJECTIVE:                          Day Sahu is a 78 year old female contacted via secure video for a follow-up visit from 10/25/2023.       Reason for visit: blood sugar check in, Victoza start check in.    Allergies/ADRs: Reviewed in chart  Past Medical History: Reviewed in chart  Tobacco: She reports that she has never smoked. She has never used smokeless tobacco.  Alcohol: not currently using    Medication Adherence/Access: no issues reported. Lives in [place] for most of the winter, back in [place] for the holidays then back to [place] until Spring.     Diabetes :  Metformin ER 1000 mg twice daily   Aspirin 162 mg daily    She was approved to start Victoza through the [employer]. Hasn't received drug yet. No medication side effects. Left glucose meter in [place]. Doesn't have another meter at home. Reports that she has been under more stress  "as while in [place]  suffered stroke and subsequent seizure, so for 2 weeks was either in hospital with him or at home taking care of him more intently. Wasn't focused on diet during that time and feels she ate higher carb foods. Also got steroid injection when at pain clinic last week and reports random blood sugar was just over 200. She was surprised by this. Later that evening denotes excessive thirst, excessive urination. This has since resolved. Self stopped Jardiance from last appointment due to high copay and causing increased urination that was bothersome. Wishes to remain off.   Blood sugar monitoring: occasionally; Ranges:not testing.   Current diabetes symptoms: none as of now  Diet/Exercise: 3 meals per day, Gets protein with each meal. She finds when she goes to [place] is more active, just got back.      Eye exam in the last 12 months? Yes- Date of last eye exam: Unknown,  Location: Unknown  Foot exam: up to date  Urine Albumin: No results found for: \"UMALCR\"   CareEverywhere Labs   10/26/2023: A1c 7.2%; 10/26/2023 Albumin/Creatinine Ratio 6   A1c 8/9/2023: 8.1%  ----------------    I spent 15 minutes with this patient today. All changes were made via collaborative practice agreement with Leigha Bradley PA-C. A copy of the visit note was provided to the patient's provider(s).    A summary of these recommendations was sent via [employer].    Lauren Bloch, PharmD, BCACP   Medication Therapy Management Pharmacist   [facility] [place]    Telemedicine Visit Details  Type of service:  Video Conference via [employer]  Start Time:  3:30 PM  End Time:  3:45 PM     Medication Therapy Recommendations  Type 2 diabetes mellitus with hyperglycemia, without long-term current use of insulin (H)    Current Medication: blood glucose (ACCU-CHEK GUIDE) test strip   Rationale: Medication requires monitoring - Needs additional monitoring   Recommendation: Start Medication   Status: Accepted per " CPA

## 2023-12-22 RX ORDER — BLOOD-GLUCOSE METER
EACH MISCELLANEOUS
Qty: 1 KIT | Refills: 0 | Status: SHIPPED | OUTPATIENT
Start: 2023-12-22

## 2023-12-22 NOTE — TELEPHONE ENCOUNTER
RX changed to contour.     Lauren Bloch, PharmD, BCACP   Medication Therapy Management Pharmacist   [facility]

## 2024-01-08 NOTE — TELEPHONE ENCOUNTER
Hello,    I'm sorry I thought I already forwarded this fax out but it I must've forgot to actually assign it to someone. Anyhow, the patient has been approved and the order should have been place on 01/01/24. It will take 10 to 14 business days to be shipped to provider's office. I can double check on status this Wednesday if needed.    Thank You!    Sav Okeefe [facility] Pharmacy Liaison  [facility]  [email or web address]  Phone: [phone]  Fax: [phone]

## 2024-02-27 ENCOUNTER — MYC MEDICAL ADVICE (OUTPATIENT)
Dept: CARDIOLOGY | Facility: CLINIC | Age: 79
End: 2024-02-27
Payer: COMMERCIAL

## 2024-02-27 DIAGNOSIS — E11.65 TYPE 2 DIABETES MELLITUS WITH HYPERGLYCEMIA, WITHOUT LONG-TERM CURRENT USE OF INSULIN (H): ICD-10-CM

## 2024-02-28 ENCOUNTER — TELEPHONE (OUTPATIENT)
Dept: SURGERY | Facility: CLINIC | Age: 79
End: 2024-02-28

## 2024-02-28 RX ORDER — LIRAGLUTIDE 6 MG/ML
INJECTION SUBCUTANEOUS
Qty: 9 ML | Refills: 1 | Status: SHIPPED | OUTPATIENT
Start: 2024-02-28

## 2024-02-28 NOTE — TELEPHONE ENCOUNTER
Hello,    I received a fax from [employer] that it may be time to reorder Victoza. I believe the patient is currently out of state and we've already given her vouchers until patient comes back but just in case this is not the case. If needed, please let me know and I will ready a reorder form.    Thank You!    Sav Okeefe [facility] Pharmacy Liaison  [facility] [email or web address]  Phone: [phone]  Fax: [phone]

## 2024-03-01 NOTE — TELEPHONE ENCOUNTER
Please start a reorder form and send it to myself to send to the provider to sign.     Thanks,   Heidy AIKEN

## 2024-03-01 NOTE — TELEPHONE ENCOUNTER
Reorder emailed to you Heidy.    Thank You!    Sav Okeefe [facility] Pharmacy Liaison  [facility]  [email or web address]  Phone: [phone]  Fax: [phone]

## 2024-03-23 ENCOUNTER — HEALTH MAINTENANCE LETTER (OUTPATIENT)
Age: 79
End: 2024-03-23

## 2024-04-02 ENCOUNTER — DOCUMENTATION ONLY (OUTPATIENT)
Dept: SURGERY | Facility: CLINIC | Age: 79
End: 2024-04-02
Payer: COMMERCIAL

## 2024-04-18 ENCOUNTER — TELEPHONE (OUTPATIENT)
Dept: SURGERY | Facility: CLINIC | Age: 79
End: 2024-04-18
Payer: COMMERCIAL

## 2024-04-18 NOTE — TELEPHONE ENCOUNTER
Called [employer] to check on status of order of Victoza.    Per automated call - next order is currently in process of being shipped.  Care team may call back in 1-2 days to get a tracking number if desired.    Beverley WHALEY RN

## 2024-04-26 ENCOUNTER — TELEPHONE (OUTPATIENT)
Dept: SURGERY | Facility: CLINIC | Age: 79
End: 2024-04-26
Payer: COMMERCIAL

## 2024-04-26 NOTE — TELEPHONE ENCOUNTER
LM for pt to contact clinic.  Need to know when back in town as her product is avail.  Gretel Rogers, MS, RD, RN

## 2024-04-30 ENCOUNTER — TELEPHONE (OUTPATIENT)
Dept: SURGERY | Facility: CLINIC | Age: 79
End: 2024-04-30
Payer: COMMERCIAL

## 2024-04-30 NOTE — TELEPHONE ENCOUNTER
LM for pt to see  msg or call back re: Victoza as it has arrived in the pharm.  Gretel Rogers, MS, RD, RN

## 2024-05-07 ENCOUNTER — TELEPHONE (OUTPATIENT)
Dept: SURGERY | Facility: CLINIC | Age: 79
End: 2024-05-07
Payer: COMMERCIAL

## 2024-05-07 NOTE — TELEPHONE ENCOUNTER
Called pt 3rd time to let pt know that Victoza has been in for a couple of weeks and pharm is holding.  Pt stated should be able to get in to pu by end of this week.  Updated [facility] pharm.  Gretel Rogers, MS, RD, RN

## 2024-05-09 ENCOUNTER — TELEPHONE (OUTPATIENT)
Dept: SURGERY | Facility: CLINIC | Age: 79
End: 2024-05-09
Payer: COMMERCIAL

## 2024-05-09 NOTE — TELEPHONE ENCOUNTER
Pt will come to clinic about 230 today to  the Victoza.  Pharm alerted.  Gretel Rogers, MS, RD, RN

## 2024-05-09 NOTE — TELEPHONE ENCOUNTER
General Call    Contacts         Type Contact Phone/Fax    05/09/2024 01:46 PM CDT Phone (Incoming) Day Sahu (Self) [phone] (M)          Reason for Call:  victoza    What are your questions or concerns:  pt states she is back in [place] and needs to come to clinic to  her Victoza and would like a call back to arrange  time    Could we send this information to you in [facility] or would you prefer to receive a phone call?:   Patient would prefer a phone call   Okay to leave a detailed message?: Yes at Cell number on file:    Telephone Information:   Mobile [phone]

## 2024-05-11 DIAGNOSIS — E11.65 TYPE 2 DIABETES MELLITUS WITH HYPERGLYCEMIA, WITHOUT LONG-TERM CURRENT USE OF INSULIN (H): ICD-10-CM

## 2024-05-13 RX ORDER — LIRAGLUTIDE 6 MG/ML
INJECTION SUBCUTANEOUS
Refills: 1 | OUTPATIENT
Start: 2024-05-13

## 2024-07-09 NOTE — TELEPHONE ENCOUNTER
Hello,    It seems to be time to reorder Victoza for patient through [employer]. Would you like me to attach Reorder form to patient's media tab and/or fax it to a specific number?    Thank You!    Sav Okeefe [facility] Pharmacy Liaison  [facility] [email or web address]  Phone: [phone]  Fax: [phone]

## 2024-07-10 NOTE — TELEPHONE ENCOUNTER
Sounds good. Please fax me a copy if clinic is going to fax directly to [employer].    Thank You!    Sav Okeefe [facility] Pharmacy Liaison  [facility]  [email or web address]  Phone: [phone]  Fax: [phone]

## 2024-07-10 NOTE — TELEPHONE ENCOUNTER
Mounika Lowery, can you do the reorder form for this patient for [employer]?

## 2024-07-11 NOTE — TELEPHONE ENCOUNTER
Thank you so much! I have a meeting typically every Wednesday with [employer]; I will check on the status next week on 7/17/24.    Thank You!    Sav Okeefe [facility] Pharmacy Liaison  [facility] [email or web address]  Phone: [phone]  Fax: [phone]

## 2024-07-11 NOTE — TELEPHONE ENCOUNTER
Hello,    I did get a copy of the Reorder form. Was this also faxed to [employer]? If not, that's totally okay; I can do so. I just wanted to know so that we don't double up on the reorder request.    Thank You!    Sav Okeefe [facility] Pharmacy Liaison  [facility] Trixie [email or web address]  Phone: [phone]  Fax: [phone]

## 2024-07-17 NOTE — TELEPHONE ENCOUNTER
Reorder form was received by [employer] per Ludivina(supervisor we meet with weekly) and is being processed.    Thank You!    Sav Okeefe [facility] Pharmacy Liaison  [facility] [email or web address]  Phone: [phone]  Fax: [phone]

## 2024-07-18 ENCOUNTER — MYC MEDICAL ADVICE (OUTPATIENT)
Dept: SURGERY | Facility: CLINIC | Age: 79
End: 2024-07-18
Payer: COMMERCIAL

## 2024-07-18 DIAGNOSIS — E11.65 TYPE 2 DIABETES MELLITUS WITH HYPERGLYCEMIA, WITHOUT LONG-TERM CURRENT USE OF INSULIN (H): Primary | ICD-10-CM

## 2024-08-05 ENCOUNTER — TELEPHONE (OUTPATIENT)
Dept: SURGERY | Facility: CLINIC | Age: 79
End: 2024-08-05
Payer: COMMERCIAL

## 2024-08-05 NOTE — TELEPHONE ENCOUNTER
Called [employer].  Was informed he Jaseza is in the process of being shipped.  To check back in 1-2 days for a tracking number.  Will do so.  Gretel Rogers, MS, RD, RN

## 2024-08-07 NOTE — TELEPHONE ENCOUNTER
Hello,    Spoke with Rep supervisor from [employer] Ludivina and she stated that there's a shipping delay. If patient needs Victoza, I can get a voucher for a month. Patient can use voucher at any pharmacy that has her Victoza prescription. Please let me know if Voucher is needed.    Thank You!    Sav Okeefe [facility] Pharmacy Liaison  [facility]  [email or web address]  Phone: [phone]  Fax: [phone]

## 2024-08-08 ENCOUNTER — TELEPHONE (OUTPATIENT)
Dept: PHARMACY | Facility: CLINIC | Age: 79
End: 2024-08-08
Payer: COMMERCIAL

## 2024-08-08 NOTE — TELEPHONE ENCOUNTER
"Called patient to relay the following:    \"Hello,     Spoke with Rep supervisor from [employer] Ludivina and she stated that there's a shipping delay. If patient needs Victoza, I can get a voucher for a month. Patient can use voucher at any pharmacy that has her Victoza prescription. Please let me know if Voucher is needed.     Thank You!    Sav Okeefe [facility] Pharmacy Liaison  [facility]  [email or web address]  Phone: [phone]  Fax: [phone]\"    No answer. Asked to call clinic, number provided or asked to respond to  message.    Beverley WHALEY RN      "

## 2024-08-10 ENCOUNTER — HEALTH MAINTENANCE LETTER (OUTPATIENT)
Age: 79
End: 2024-08-10

## 2024-08-19 NOTE — TELEPHONE ENCOUNTER
Hello,    Spoke to Supervisor Ludivina today and she said that the Victoza was shipped on 08/13/2024.    Thank You!    Sav Okeefe [facility] Pharmacy Liaison  [facility] Trixie [email or web address]  Phone: [phone]  Fax: [phone]

## 2024-08-21 ENCOUNTER — TELEPHONE (OUTPATIENT)
Dept: SURGERY | Facility: CLINIC | Age: 79
End: 2024-08-21
Payer: COMMERCIAL

## 2024-08-21 NOTE — TELEPHONE ENCOUNTER
Called [facility] [facility] to check on status of shipment.    According to automated system, this is currently being shipped.    Beverley Myers RN on 8/21/2024 at 10:08 AM
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
## 2024-08-28 ENCOUNTER — OFFICE VISIT (OUTPATIENT)
Dept: SURGERY | Facility: CLINIC | Age: 79
End: 2024-08-28
Payer: COMMERCIAL

## 2024-08-28 VITALS
OXYGEN SATURATION: 93 % | HEIGHT: 64 IN | WEIGHT: 266 LBS | HEART RATE: 76 BPM | BODY MASS INDEX: 45.41 KG/M2 | DIASTOLIC BLOOD PRESSURE: 80 MMHG | RESPIRATION RATE: 16 BRPM | SYSTOLIC BLOOD PRESSURE: 120 MMHG

## 2024-08-28 DIAGNOSIS — E66.01 MORBID OBESITY (H): Primary | ICD-10-CM

## 2024-08-28 DIAGNOSIS — E11.65 TYPE 2 DIABETES MELLITUS WITH HYPERGLYCEMIA, WITHOUT LONG-TERM CURRENT USE OF INSULIN (H): ICD-10-CM

## 2024-08-28 PROCEDURE — 99214 OFFICE O/P EST MOD 30 MIN: CPT | Performed by: PHYSICIAN ASSISTANT

## 2024-08-28 NOTE — PATIENT INSTRUCTIONS
"Nice to talk with you today! Thank you for allowing me the privilege of caring for you.   We hope we provided you with the excellent service you deserve.     To ensure the quality of our services you may receive a patient satisfaction survey from an independent monitoring company.  The greatest compliment you can give is \"Likely to Recommend\"      Below is our plan we discussed.-  WAYNE Anaya      Plan:  Eat breakfast daily  Remove electronics from bedroom  Continue with Victoza    Please schedule a follow up with Leigha Bradley PA-C in May.  If you need to reach me sooner you can do so by calling [phone].    Have a great day!     Eat Better ? Move More ? Live Well    Eat 3 nutrient-rich meals each day    Don t skip meals--it will cause you to overeat later in the day!    Eating fiber (vegetables/fruits/whole grains) and protein with meals helps you stay full longer    Choose foods with less than 10 grams of sugar and 5 grams of fat per serving to prevent excess calories and weight gain  Eat around the same times each day to develop a routine eating schedule   Avoid snacking unless physically hungry.   Planned snacks: 1-2 times per day and no more than 150 calories    Eat protein first   Protein helps with healing, maintaining adequate muscle mass, reducing hunger and optimizing nutritional status   Aim for 60-80 grams of protein per day   Fill up on Fiber   Fiber comes from plants--fruits, veggies, whole grains, nuts/seeds and beans   Fiber is low in calories, high in phytonutrients and helps you stay full longer   Aim for 25-35 grams per day by eating fiber with meals and snacks  Eat [id number]   Take 20-30 minutes to eat each meal by taking small bites, chewing foods to applesauce consistency or 20-30 times before you swallow   Eating foods too fast can delay satiety/fullness signals and increase overeating   Slow down your eating by using toddler utensils, putting your fork/spoon down between bites and " not watching TV or emailing during meals!   Keep a Journal         Writing down what you eat, how you feel and when you are active helps you identify new changes to work on from week to week         Look for ways to cut 100 calories from your current diet 2-3 times per day  Drink 64 ounces of 0-Calorie drinks between meals   Water   Zero calorie Propel  or Vitamin Water     SoBe Lifewater  Zero Calories   Crystal Light , Sugar-Free Braden-Aid , and other sugar-free lemonade or flavored renee   Keep Caffeine to less than 300mg per day ie: 3-6oz cups coffee     Work up to 45-60 minutes of physical activity most days of the week   Helps with losing weight and prevent regaining those extra pounds!    Do a combo of cardio (walking/water exercises) and strength training (lifting weights/Vinyasa yoga)    Avoid Mindless Eating   Be present when you eat--take note of the smell, taste and quality of your food   Make a list of alternative activities you could do to prevent eating out of boredom/stress  Go for a walk, call a friend, chew gum, paint your nails, re-organize the garage, etc

## 2024-10-14 ENCOUNTER — VIRTUAL VISIT (OUTPATIENT)
Dept: PHARMACY | Facility: CLINIC | Age: 79
End: 2024-10-14
Attending: PHYSICIAN ASSISTANT
Payer: COMMERCIAL

## 2024-10-14 ENCOUNTER — TELEPHONE (OUTPATIENT)
Dept: SURGERY | Facility: CLINIC | Age: 79
End: 2024-10-14

## 2024-10-14 VITALS — BODY MASS INDEX: 42.68 KG/M2 | HEIGHT: 64 IN | WEIGHT: 250 LBS

## 2024-10-14 DIAGNOSIS — E11.65 TYPE 2 DIABETES MELLITUS WITH HYPERGLYCEMIA, WITHOUT LONG-TERM CURRENT USE OF INSULIN (H): Primary | ICD-10-CM

## 2024-10-14 ASSESSMENT — PAIN SCALES - GENERAL: PAINLEVEL: MILD PAIN (3)

## 2024-10-14 NOTE — PROGRESS NOTES
Medication Therapy Management (MTM) Encounter    ASSESSMENT:                            Medication Adherence/Access: Current PAP with NovoNordisk will likely need to be renewed by the end of the year. Aligning timing of refill with patient's return to [place] would be most efficient.     Diabetes:   Stable with current therapy, A1c improved. Patient reported FBG seem to be above goal of , more consistent monitoring would be useful to further understand glucose trends.     PLAN:                            Check fasting blood sugars in the morning more consistently.   Pharmacist to follow-up with pharmacy liaison team on renewal of PAP application.     Follow-up: as needed     SUBJECTIVE/OBJECTIVE:                          Day Sahu is a 78 year old female seen for a follow-up visit.  Last visit with Lauren Bloch, PharmD on 12/2023.    Reason for visit: Victoza follow-up.    Allergies/ADRs: Reviewed in chart  Past Medical History: Reviewed in chart  Tobacco: She reports that she has never smoked. She has never used smokeless tobacco.  Alcohol: 1-3 beverages / week    Medication Adherence/Access: no issues reported, currently getting Victoza through [facility] P[facility].     Diabetes   Victoza 1.8 mg once daily   Metformin ER 1000 mg twice daily   Aspirin 81 mg daily    Some constipation, has been trying to take fiber supplement, struggling with needing to take 2 hours apart from other medications. (Balance of nature fiber & spice - 1.5 mg)  A1c improved     Victoza - Has enough medication through end of the year - spends winters in [place], will be home again in December 15-29ish. Needs to  medication then. Aug 28th last - 4 boxes, 12 pens - 2 doses left in current pen (Gretel?)    No medication side effects. Self stopped Jardiance previously due to high copay and causing increased urination that was bothersome. Wishes to remain off.   Current diabetes symptoms: none as of now  Diet/Exercise: 3 meals per day, Gets  "protein with each meal. She finds when she goes to [place] is more active, just got back.        Blood sugar monitoring: checking every 2-3 days, random times of day; Ranges: 9/27 7:15pm - 124, 9/28 1pm - 162, 10/8 10:30am 185 (fasting)  Today during visit - fasting, 168 mg/dL - ate around 9pm, fig montoya cookie bar, piece of apple pie       Today's Vitals: Ht 5' 4\" (1.626 m)   Wt 250 lb (113.4 kg)   BMI 42.91 kg/m    ----------------      I spent 35 minutes with this patient today. All changes were made via collaborative practice agreement with Leigha Bradley. A copy of the visit note was provided to the patient's provider(s).    A summary of these recommendations was sent via clinic portal.    Bailey Cortez, PharmD, BCACP  Medication Therapy Management (MTM) Pharmacist   [facility]      Telemedicine Visit Details  The patient's medications can be safely assessed via a telemedicine encounter.  Type of service:  Telephone visit  Originating Location (pt. Location): Home    Distant Location (provider location):  Off-site  Start Time:  11:17 AM  End Time:  11:53 AM     Medication Therapy Recommendations  Type 2 diabetes mellitus with hyperglycemia, without long-term current use of insulin (H)    Rationale: Medication requires monitoring - Needs additional monitoring   Recommendation: Self-Monitoring   Status: Patient Agreed - Adherence/Education          Rationale: Cannot afford medication product - Cost - Adherence   Recommendation: Referral to Service    Status: Resolved Med Access Issue            "

## 2024-10-14 NOTE — NURSING NOTE
Current patient location: [address] [place] [id number]    Is the patient currently in the state of [place]? YES    Visit mode:TELEPHONE    If the visit is dropped, the patient can be reconnected by: TELEPHONE VISIT: Phone number:   Telephone Information:   Mobile [phone]       Will anyone else be joining the visit? NO  (If patient encounters technical issues they should call [phone]    Are changes needed to the allergy or medication list? No    Are refills needed on medications prescribed by this physician? NO    Rooming Documentation:  Questionnaire(s) not pre-assigned    Reason for visit: Medication Therapy Management    Yumiko DE LA ROSA

## 2024-10-14 NOTE — TELEPHONE ENCOUNTER
Hello,    I assume that patient still in need of Victoza through the [facility] [employer] Patient Assistance Program?    Thank You!    Sav Okeefe [facility] Pharmacy Liaison  [facility] [place]  [email or web address]  Phone: [phone]  Fax: [phone]

## 2024-10-16 NOTE — TELEPHONE ENCOUNTER
Hello,    Sounds good. I have started the application for 2025. I will contact the patient. Where should I email/fax or attach the PAP application for provider to complete?    Thank You!    Sav Okeefe [facility] Pharmacy Liaison  [facility] [email or web address]  Phone: [phone]  Fax: [phone]

## 2024-10-16 NOTE — TELEPHONE ENCOUNTER
Hello,     I saw this patient for a follow-up appointment on 10/14/24. Patient last picked up 4 month supply of Victoza on 8/28/2024. She will need to continue receiving medication through [employer]av[employer] PAP program for 2025. Can you help with initiating the renewal of the PAP application?     Baiely Cortez, PharmD, BCACP  Medication Therapy Management (MTM) Pharmacist   [facility]

## 2024-10-16 NOTE — PATIENT INSTRUCTIONS
"Recommendations from today's MTM visit:                                                    Check fasting blood sugars in the morning more consistently.   Pharmacist to follow-up on renewal of PAP application.     Follow-up: as needed     It was great speaking with you today.  I value your experience and would be very thankful for your time in providing feedback in our clinic survey. In the next few days, you may receive an email or text message from [facility] [employer] with a link to a survey related to your  clinical pharmacist.\"     To schedule another MTM appointment, please call the clinic directly or you may call the MTM scheduling line at [phone] or toll-free at [phone].     My Clinical Pharmacist's contact information:                                                      Please feel free to contact me with any questions or concerns you have.      Bailey Cortez, PharmD, [facility]  Medication Therapy Management (MTM) Pharmacist   [facility]     "

## 2024-10-18 NOTE — TELEPHONE ENCOUNTER
Mounika Avalos,    Can I get your email/fax to send the application? Email is preferred for quality purposes.    Thank You!    Sav Okeefe [facility] Pharmacy Liaison  [facility] [place] [email or web address]  Phone: [phone]  Fax: [phone]

## 2024-12-22 ENCOUNTER — HEALTH MAINTENANCE LETTER (OUTPATIENT)
Age: 79
End: 2024-12-22

## 2025-04-12 ENCOUNTER — HEALTH MAINTENANCE LETTER (OUTPATIENT)
Age: 80
End: 2025-04-12

## 2025-05-16 NOTE — PROGRESS NOTES
2025      Return Medical Weight Management Note     Day Sahu  MRN:  [id number]  :  1945    Assessment & Plan   Problem List Items Addressed This Visit       Type 2 diabetes mellitus with hyperglycemia, without long-term current use of insulin (H)    Pt stopped Victoza- BS went up immediately.   Reviewed Ozempic.  Pt will consider and mychart if interested.  She will continue metformin.          Class 3 obesity (H) - Primary    Pt stopped victoza.  Did not like daily injections and was not sure it was working.  Gained 20 lbs since Feb.  Concerned about restarting any AOM.  Has some interest in bariatric surgery.  Literature sent today to review- both ozempic and bariatric surgery             AOM Considerations:  Phentermine:    Topiramate:    GLP-1:  Victoza worked well.  Lost 20 lbsm improve BS but pt stopped.  Did not want to be on AOM's, candidate for ozempic, mounjaro   Naltrexone:    Wellbutrin:    Metformin:    Contrave:  Qsymia:    PATIENT INSTRUCTIONS:  Review obesity videos below  Review information about Ozempic.  Mychart if interested.  Mychart if interested in starting bariatric process.     Follow up: in 3 mo.    50 minutes spent on the date of the encounter doing chart review, history and exam, result review, counseling, developing plan of care, documentation, and further activities as noted      INTERVAL HISTORY:  Day returns for medical weight management follow up.  10/15/2007 Lap Band placed LEL on 10/10/2023 Lap Band Removal LEL due to vomiting. Last seen on 2024 by Jaclyn Ramirez PA-C. On Victoza.  Was in [place] over winter.   Stopped Victoza in Feb.  Did not think it was working.  Since then gained 20 lbs and BS increased.  Did not like the daily injections. Has some interested in bariatric surgery.     Goals:- Did not review.    Eat breakfast daily  Remove electronics from bedroom    WEIGHT METRICS:  Body mass index is 45.73 kg/m .   Current Weight: 266 lb 6.4 oz  (120.8 kg)  Last Visits Weight: 266 lb (120.7 kg)  Initial Weight (lbs): 251 lbs  Cumulative weight loss (lbs): -15.4  Weight Loss Percentage: -6.14%    Wt Readings from Last 10 Encounters:   05/19/25 266 lb 6.4 oz (120.8 kg)   10/14/24 250 lb (113.4 kg)   08/28/24 266 lb (120.7 kg)   10/10/23 256 lb (116.1 kg)   09/11/23 251 lb (113.9 kg)   08/19/19 264 lb (119.7 kg)   09/28/18 264 lb 9.6 oz (120 kg)   07/19/18 261 lb 8 oz (118.6 kg)   05/24/13 257 lb 4.8 oz (116.7 kg)                 5/17/2025     1:29 PM   Weight Loss Medication History Reviewed With Patient   Which weight loss medications are you currently taking on a regular basis? None   If you are not taking a weight loss medication that was prescribed to you, please indicate why: It did not seem to be helping me   Are you having any side effects from the weight loss medication that we have prescribed you? No           5/17/2025     1:29 PM   Changes and Difficulties   I have made the following changes to my diet since my last visit: none   With regards to my diet, I am still struggling with: overeating   I have made the following changes to my activity/exercise since my last visit: cleaning and packing our house to downsize   With regards to my activity/exercise, I am still struggling with: motivation, ability to walk normally       LABS:  Hemoglobin A1C   Date Value Ref Range Status   07/11/2018 6.4 (A) <=5.6 % Final     Creatinine   Date Value Ref Range Status   08/20/2019 0.55 0.52 - 1.04 mg/dL Final     GFR Estimate   Date Value Ref Range Status   08/20/2019 >90 >60 mL/min/[1.73_m2] Final     Comment:     Non  GFR Calc  Starting 12/18/2018, serum creatinine based estimated GFR (eGFR) will be   calculated using the Chronic Kidney Disease Epidemiology Collaboration   (CKD-EPI) equation.       Carbon Dioxide   Date Value Ref Range Status   08/20/2019 31 20 - 32 mmol/L Final     Chloride   Date Value Ref Range Status   08/20/2019 108 94 - 109  "mmol/L Final     Urea Nitrogen   Date Value Ref Range Status   08/20/2019 16 7 - 30 mg/dL Final     ALT   Date Value Ref Range Status   03/23/2007 21 0 - 50 U/L Final     AST   Date Value Ref Range Status   03/23/2007 24 0 - 45 U/L Final     TSH   Date Value Ref Range Status   03/23/2007 2.03 0.4 - 5.0 mU/L Final        BP Readings from Last 6 Encounters:   05/19/25 130/79   08/28/24 120/80   10/10/23 112/80   09/11/23 131/75   08/20/19 124/63   09/28/18 138/69       Pulse Readings from Last 6 Encounters:   05/19/25 81   08/28/24 76   10/10/23 67   09/11/23 77   08/20/19 72   09/28/18 81       PE:  /79   Pulse 81   Ht 5' 4\" (1.626 m)   Wt 266 lb 6.4 oz (120.8 kg)   SpO2 92%   BMI 45.73 kg/m    GENERAL: Healthy, alert and no distress  RESP: No audible wheeze, cough, or visible cyanosis.  No visible retractions or increased work of breathing.    SKIN: Visible skin clear. No significant rash, abnormal pigmentation or lesions.  PSYCH: Mentation appears normal, affect normal/bright, judgement and insight intact        "

## 2025-05-19 ENCOUNTER — OFFICE VISIT (OUTPATIENT)
Dept: SURGERY | Facility: CLINIC | Age: 80
End: 2025-05-19
Payer: COMMERCIAL

## 2025-05-19 VITALS
DIASTOLIC BLOOD PRESSURE: 79 MMHG | SYSTOLIC BLOOD PRESSURE: 130 MMHG | HEIGHT: 64 IN | WEIGHT: 266.4 LBS | HEART RATE: 81 BPM | BODY MASS INDEX: 45.48 KG/M2 | OXYGEN SATURATION: 92 %

## 2025-05-19 DIAGNOSIS — E11.65 TYPE 2 DIABETES MELLITUS WITH HYPERGLYCEMIA, WITHOUT LONG-TERM CURRENT USE OF INSULIN (H): ICD-10-CM

## 2025-05-19 DIAGNOSIS — E66.813 CLASS 3 OBESITY (H): Primary | ICD-10-CM

## 2025-05-19 PROCEDURE — 3075F SYST BP GE 130 - 139MM HG: CPT | Performed by: PHYSICIAN ASSISTANT

## 2025-05-19 PROCEDURE — 3078F DIAST BP <80 MM HG: CPT | Performed by: PHYSICIAN ASSISTANT

## 2025-05-19 PROCEDURE — 99215 OFFICE O/P EST HI 40 MIN: CPT | Performed by: PHYSICIAN ASSISTANT

## 2025-05-19 PROCEDURE — G2211 COMPLEX E/M VISIT ADD ON: HCPCS | Performed by: PHYSICIAN ASSISTANT

## 2025-05-19 NOTE — ASSESSMENT & PLAN NOTE
Pt stopped Victoza- BS went up immediately.   Reviewed Ozempic.  Pt will consider and mychart if interested.  She will continue metformin.

## 2025-05-19 NOTE — PATIENT INSTRUCTIONS
Nice to talk with you today. Below is the plan discussed.-  WAYNE Ahuja      Pt Instructions:   Review obesity videos below  Review information about Ozempic.  Mychart if interested.  Mychart if interested in starting bariatric process.     Follow up: in 3 mo.    Obesity Medicine Videos (Copy link into browser to view)    Explaining Obesity: Meet Charlene  https://www.SiConnectube.com/watch?v=GTscWc2GBTO    How Obesity Medicine Works  https://www.SiConnectube.com/watch?v=9GrGwHg8W2N    Bariatric Surgery Education Videos (copy and paste into browser to view)    How bariatric surgery Works:    https://www.SiConnectube.com/watch?v=RK3qqR0DBXa    Bariatric Surgery: Not an extreme solution  https://[employer]//VB Rags.Aqdot/video/whiteboards/A-Bariatric-Surgery-Story-Not-an-Extreme-Solution-to-Treat-Your-Obesity.mp4    Myths about Bariatric Surgery   https://www.SiConnectube.com/watch?v=UPCTQ0vGa96     Semaglutide (Ozempic)    We are starting Semaglutide (Ozempic) a GLP-1 (Glucagon-like Peptide-1) medication. It can help you lose weight and control your blood sugar. One of the ways it works is by slowing down the rate that food leaves your stomach. You feel villa and will eat less.  It also helps regulate hormones that can help improve your blood sugars.    Dosing Instructions: Starting dose is 0.25 mg once weekly for 4 weeks, and then increase to 0.5 mg weekly. If after 4 weeks of being on 0.5 mg weekly dosing and still wanting additional weight loss and/or blood sugar benefits, check with your doctor to see if an increase is appropriate. Pen needles come in the Ozempic pen box  Side effects of GLP- Medications include: The most common side effects are all GI related and consist of: nausea, constipation, diarrhea, burping, or gassiness. Patients are advised to eat slowly and less, and nausea typically passes if people can stick it out.     The risk of pancreatitis (inflammation of the pancreas) has been associated with this type  of medication, but is very rare.  If you have had pancreatitis in the past, this medication may not be for you. Please let us know about any past history of pancreas problems.      Symptoms of pancreatitis include: Pain in your upper stomach area which  may travel to your back and be worse after eating. Your stomach area may be tender to the touch.  You may have vomiting or nausea and/or have a fever. If you should develop any of these symptoms, stop the medication and contact your primary care doctor. They will do a blood test to check for pancreatitis.       For any questions/concerns contact [facility] at [phone]    (Do not stop taking it if you don't think it's working. For some people it works without them knowing it.)

## 2025-05-19 NOTE — ASSESSMENT & PLAN NOTE
Pt stopped victoza.  Did not like daily injections and was not sure it was working.  Gained 20 lbs since Feb.  Concerned about restarting any AOM.  Has some interest in bariatric surgery.  Literature sent today to review- both ozempic and bariatric surgery

## 2025-07-26 ENCOUNTER — HEALTH MAINTENANCE LETTER (OUTPATIENT)
Age: 80
End: 2025-07-26

## (undated) DEVICE — DRAPE LEGGINGS 8421

## (undated) DEVICE — GLOVE PROTEXIS POWDER FREE 6.5 ORTHOPEDIC 2D73ET65

## (undated) DEVICE — HOOD FLYTE W/PEELAWAY 408-800-100

## (undated) DEVICE — LINEN TOWEL PACK X5 5464

## (undated) DEVICE — SOL WATER IRRIG 1000ML BOTTLE 2F7114

## (undated) DEVICE — GLOVE PROTEXIS W/NEU-THERA 8.0  2D73TE80

## (undated) DEVICE — DRAPE MINI C-ARM 4003

## (undated) DEVICE — NDL 22GA 1.5"

## (undated) DEVICE — ESU CORD BIPOLAR 12' E0509

## (undated) DEVICE — SU ETHILON 4-0 FS-2 18" 662H

## (undated) DEVICE — GLOVE PROTEXIS POWDER FREE 7.5 ORTHOPEDIC 2D73ET75

## (undated) DEVICE — ESU HOLDER LAP INST DISP PURPLE LONG 330MM H-PRO-330

## (undated) DEVICE — ESU PENCIL W/SMOKE EVAC NEPTUNE STRYKER 0703-046-000

## (undated) DEVICE — CAST PADDING 4" UNSTERILE 9044

## (undated) DEVICE — GLOVE BIOGEL PI MICRO INDICATOR UNDERGLOVE SZ 7.5 48975

## (undated) DEVICE — SOL NACL 0.9% IRRIG 1000ML BOTTLE 07138-09

## (undated) DEVICE — ENDO TROCAR SLEEVE KII Z-THREADED 05X100MM CTS02

## (undated) DEVICE — BLADE SAW SAGITTAL STRK 21X90X1.27MM HD SYS 6 6221-127-090

## (undated) DEVICE — GLOVE PROTEXIS BLUE W/NEU-THERA 6.5  2D73EB65

## (undated) DEVICE — CAST PADDING 4" STERILE 9044S

## (undated) DEVICE — IMM KNEE 20" 0814-2660

## (undated) DEVICE — PREP CHLORAPREP 26ML TINTED ORANGE  260815

## (undated) DEVICE — SU VICRYL 2-0 CP-1 27" UND J266H

## (undated) DEVICE — GLOVE PROTEXIS POWDER FREE 8.0 ORTHOPEDIC 2D73ET80

## (undated) DEVICE — TOURNIQUET CUFF 30" STERILE

## (undated) DEVICE — SYR 30ML LL W/O NDL 302832

## (undated) DEVICE — BLADE SAW OSCILLATING STRYK MED 9.0X25X0.38MM 2296-003-111

## (undated) DEVICE — PACK HAND WRIST SOP15HWFSP

## (undated) DEVICE — DRSG XEROFORM 5X9" 8884431605

## (undated) DEVICE — DRSG KERLIX FLUFFS X5

## (undated) DEVICE — GLOVE PROTEXIS POWDER FREE 8.5 ORTHOPEDIC 2D73ET85

## (undated) DEVICE — KIT SURGICAL TURNOVER FVSD-01D

## (undated) DEVICE — BONE CLEANING TIP INTERPULSE  0210-010-000

## (undated) DEVICE — ESU PENCIL W/HOLSTER E2350H

## (undated) DEVICE — PACK LAP CHOLE SLC15LCFSD

## (undated) DEVICE — DRSG KERLIX 4 1/2"X4YDS ROLL 6715

## (undated) DEVICE — BNDG KLING 2" 2231

## (undated) DEVICE — SU VICRYL 3-0 SH 27" J316H

## (undated) DEVICE — SUCTION CANISTER MEDIVAC LINER 3000ML W/LID 65651-530

## (undated) DEVICE — EVAC SYSTEM CLEAR FLOW SC082500

## (undated) DEVICE — SU ETHILON 4-0 PC-3 18" 1864G

## (undated) DEVICE — DRAIN ROUND W/RESERV KIT JACKSON PRATT 10FR 400ML SU130-402D

## (undated) DEVICE — DEVICE SUTURE PASSER 14GA WECK EFX EFXSP2

## (undated) DEVICE — SU VICRYL 0 CT-1 27" UND J260H

## (undated) DEVICE — SU VICRYL 3-0 RB-1 27" UND J215H

## (undated) DEVICE — PREP CHLORAPREP 26ML TINTED HI-LITE ORANGE 930815

## (undated) DEVICE — WRAP EZY KNEE

## (undated) DEVICE — SU STRATAFIX PDS PLUS 1 CT-1 18" SXPP1A404

## (undated) DEVICE — NDL SPINAL 18GA 3.5" 405184

## (undated) DEVICE — BLADE SAW SAGITTAL STRK 29X83.5X1.27MM 4/2000 2108-183-000

## (undated) DEVICE — SUCTION IRR STRYKERFLOW II W/TIP 250-070-520

## (undated) DEVICE — SU VICRYL 0 UR-6 27" J603H

## (undated) DEVICE — GOWN IMPERVIOUS SPECIALTY XL/XLONG 39049

## (undated) DEVICE — ENDO TROCAR FIRST ENTRY KII FIOS Z-THRD 05X100MM CTF03

## (undated) DEVICE — PACK TOTAL KNEE SOP15TKFSD

## (undated) DEVICE — BNDG ABDOMINAL BINDER 9X62-84" 79-89210

## (undated) DEVICE — BONE CEMENT MIXEVAC III HI VAC KIT  0206-015-000

## (undated) DEVICE — SU ETHIBOND 0 CTX CR  8X18" CX31D

## (undated) DEVICE — GLOVE BIOGEL PI MICRO SZ 7.5 48575

## (undated) DEVICE — ESU GROUND PAD UNIVERSAL W/O CORD

## (undated) DEVICE — SYR BULB IRRIG 50ML LATEX FREE 0035280

## (undated) DEVICE — SUCTION IRR SYSTEM W/O TIP INTERPULSE HANDPIECE 0210-100-000

## (undated) DEVICE — SU VICRYL 4-0 PS-2 18" UND J496H

## (undated) DEVICE — SU VICRYL 0 CP-1 27" J467H

## (undated) DEVICE — DECANTER BAG 2002S

## (undated) DEVICE — CAST PADDING 6" UNSTERILE 9046

## (undated) DEVICE — MANIFOLD NEPTUNE 4 PORT 700-20

## (undated) RX ORDER — FENTANYL CITRATE 50 UG/ML
INJECTION, SOLUTION INTRAMUSCULAR; INTRAVENOUS
Status: DISPENSED
Start: 2018-07-19

## (undated) RX ORDER — PROPOFOL 10 MG/ML
INJECTION, EMULSION INTRAVENOUS
Status: DISPENSED
Start: 2023-10-10

## (undated) RX ORDER — LIDOCAINE HYDROCHLORIDE 20 MG/ML
INJECTION, SOLUTION EPIDURAL; INFILTRATION; INTRACAUDAL; PERINEURAL
Status: DISPENSED
Start: 2019-08-19

## (undated) RX ORDER — BUPIVACAINE HYDROCHLORIDE 2.5 MG/ML
INJECTION, SOLUTION EPIDURAL; INFILTRATION; INTRACAUDAL
Status: DISPENSED
Start: 2023-10-10

## (undated) RX ORDER — ONDANSETRON 2 MG/ML
INJECTION INTRAMUSCULAR; INTRAVENOUS
Status: DISPENSED
Start: 2018-07-19

## (undated) RX ORDER — HYDROMORPHONE HYDROCHLORIDE 1 MG/ML
INJECTION, SOLUTION INTRAMUSCULAR; INTRAVENOUS; SUBCUTANEOUS
Status: DISPENSED
Start: 2023-10-10

## (undated) RX ORDER — DEXAMETHASONE SODIUM PHOSPHATE 4 MG/ML
INJECTION, SOLUTION INTRA-ARTICULAR; INTRALESIONAL; INTRAMUSCULAR; INTRAVENOUS; SOFT TISSUE
Status: DISPENSED
Start: 2023-10-10

## (undated) RX ORDER — PROPOFOL 10 MG/ML
INJECTION, EMULSION INTRAVENOUS
Status: DISPENSED
Start: 2018-07-19

## (undated) RX ORDER — EPINEPHRINE 1 MG/ML
INJECTION, SOLUTION INTRAMUSCULAR; SUBCUTANEOUS
Status: DISPENSED
Start: 2018-07-19

## (undated) RX ORDER — CEFAZOLIN SODIUM 1 G/50ML
SOLUTION INTRAVENOUS
Status: DISPENSED
Start: 2018-09-28

## (undated) RX ORDER — CEFAZOLIN SODIUM 2 G/100ML
INJECTION, SOLUTION INTRAVENOUS
Status: DISPENSED
Start: 2018-07-19

## (undated) RX ORDER — BUPIVACAINE HYDROCHLORIDE 5 MG/ML
INJECTION, SOLUTION EPIDURAL; INTRACAUDAL
Status: DISPENSED
Start: 2018-07-19

## (undated) RX ORDER — DEXAMETHASONE SODIUM PHOSPHATE 4 MG/ML
INJECTION, SOLUTION INTRA-ARTICULAR; INTRALESIONAL; INTRAMUSCULAR; INTRAVENOUS; SOFT TISSUE
Status: DISPENSED
Start: 2018-07-19

## (undated) RX ORDER — FENTANYL CITRATE 50 UG/ML
INJECTION, SOLUTION INTRAMUSCULAR; INTRAVENOUS
Status: DISPENSED
Start: 2019-08-19

## (undated) RX ORDER — ACYCLOVIR 200 MG/1
CAPSULE ORAL
Status: DISPENSED
Start: 2019-08-19

## (undated) RX ORDER — NEOSTIGMINE METHYLSULFATE 1 MG/ML
VIAL (ML) INJECTION
Status: DISPENSED
Start: 2018-07-19

## (undated) RX ORDER — PROPOFOL 10 MG/ML
INJECTION, EMULSION INTRAVENOUS
Status: DISPENSED
Start: 2019-08-19

## (undated) RX ORDER — LIDOCAINE HYDROCHLORIDE 10 MG/ML
INJECTION, SOLUTION EPIDURAL; INFILTRATION; INTRACAUDAL; PERINEURAL
Status: DISPENSED
Start: 2018-07-19

## (undated) RX ORDER — ONDANSETRON 2 MG/ML
INJECTION INTRAMUSCULAR; INTRAVENOUS
Status: DISPENSED
Start: 2023-10-10

## (undated) RX ORDER — ROPIVACAINE HYDROCHLORIDE 2 MG/ML
INJECTION, SOLUTION EPIDURAL; INFILTRATION; PERINEURAL
Status: DISPENSED
Start: 2019-08-19

## (undated) RX ORDER — KETOROLAC TROMETHAMINE 30 MG/ML
INJECTION, SOLUTION INTRAMUSCULAR; INTRAVENOUS
Status: DISPENSED
Start: 2019-08-19

## (undated) RX ORDER — FENTANYL CITRATE 50 UG/ML
INJECTION, SOLUTION INTRAMUSCULAR; INTRAVENOUS
Status: DISPENSED
Start: 2018-09-28

## (undated) RX ORDER — CEFAZOLIN SODIUM IN 0.9 % NACL 3 G/100 ML
INTRAVENOUS SOLUTION, PIGGYBACK (ML) INTRAVENOUS
Status: DISPENSED
Start: 2019-08-19

## (undated) RX ORDER — LIDOCAINE HYDROCHLORIDE 20 MG/ML
INJECTION, SOLUTION EPIDURAL; INFILTRATION; INTRACAUDAL; PERINEURAL
Status: DISPENSED
Start: 2018-07-19

## (undated) RX ORDER — BUPIVACAINE HYDROCHLORIDE AND EPINEPHRINE 2.5; 5 MG/ML; UG/ML
INJECTION, SOLUTION EPIDURAL; INFILTRATION; INTRACAUDAL; PERINEURAL
Status: DISPENSED
Start: 2019-08-19

## (undated) RX ORDER — GLYCOPYRROLATE 0.2 MG/ML
INJECTION, SOLUTION INTRAMUSCULAR; INTRAVENOUS
Status: DISPENSED
Start: 2018-07-19

## (undated) RX ORDER — GINSENG 100 MG
CAPSULE ORAL
Status: DISPENSED
Start: 2018-07-19

## (undated) RX ORDER — CEFAZOLIN SODIUM/WATER 2 G/20 ML
SYRINGE (ML) INTRAVENOUS
Status: DISPENSED
Start: 2023-10-10

## (undated) RX ORDER — FENTANYL CITRATE 50 UG/ML
INJECTION, SOLUTION INTRAMUSCULAR; INTRAVENOUS
Status: DISPENSED
Start: 2023-10-10

## (undated) RX ORDER — LIDOCAINE HYDROCHLORIDE AND EPINEPHRINE 10; 10 MG/ML; UG/ML
INJECTION, SOLUTION INFILTRATION; PERINEURAL
Status: DISPENSED
Start: 2018-07-19